# Patient Record
Sex: MALE | Race: WHITE | Employment: FULL TIME
[De-identification: names, ages, dates, MRNs, and addresses within clinical notes are randomized per-mention and may not be internally consistent; named-entity substitution may affect disease eponyms.]

---

## 2017-01-11 ENCOUNTER — OFFICE VISIT (OUTPATIENT)
Dept: FAMILY MEDICINE CLINIC | Facility: CLINIC | Age: 72
End: 2017-01-11

## 2017-01-11 VITALS
HEIGHT: 73 IN | DIASTOLIC BLOOD PRESSURE: 77 MMHG | WEIGHT: 224.6 LBS | BODY MASS INDEX: 29.77 KG/M2 | SYSTOLIC BLOOD PRESSURE: 128 MMHG | RESPIRATION RATE: 18 BRPM | HEART RATE: 63 BPM

## 2017-01-11 DIAGNOSIS — Z23 ENCOUNTER FOR VACCINATION: ICD-10-CM

## 2017-01-11 DIAGNOSIS — L91.8 SKIN TAG: ICD-10-CM

## 2017-01-11 DIAGNOSIS — Z00.00 ANNUAL PHYSICAL EXAM: Primary | ICD-10-CM

## 2017-01-11 PROCEDURE — 99397 PER PM REEVAL EST PAT 65+ YR: CPT | Performed by: FAMILY MEDICINE

## 2017-01-11 PROCEDURE — 90715 TDAP VACCINE 7 YRS/> IM: CPT | Performed by: FAMILY MEDICINE

## 2017-01-11 PROCEDURE — 90471 IMMUNIZATION ADMIN: CPT | Performed by: FAMILY MEDICINE

## 2017-01-11 RX ORDER — OYSTER SHELL CALCIUM WITH VITAMIN D 500; 200 MG/1; [IU]/1
1 TABLET, FILM COATED ORAL DAILY
Qty: 30 TABLET | Refills: 3 | Status: SHIPPED | OUTPATIENT
Start: 2017-01-11 | End: 2020-12-14

## 2017-01-11 ASSESSMENT — PATIENT HEALTH QUESTIONNAIRE - PHQ9
SUM OF ALL RESPONSES TO PHQ9 QUESTIONS 1 & 2: 0
SUM OF ALL RESPONSES TO PHQ QUESTIONS 1-9: 0
2. FEELING DOWN, DEPRESSED OR HOPELESS: 0
1. LITTLE INTEREST OR PLEASURE IN DOING THINGS: 0

## 2017-02-23 ENCOUNTER — HOSPITAL ENCOUNTER (OUTPATIENT)
Age: 72
Discharge: HOME | End: 2017-02-23
Payer: MEDICARE

## 2017-02-23 PROCEDURE — 93005 ELECTROCARDIOGRAM TRACING: CPT

## 2017-02-24 LAB
EKG ATRIAL RATE: 61 BPM
EKG P AXIS: 68 DEGREES
EKG P-R INTERVAL: 202 MS
EKG Q-T INTERVAL: 458 MS
EKG QRS DURATION: 156 MS
EKG QTC CALCULATION (BAZETT): 461 MS
EKG R AXIS: -31 DEGREES
EKG T AXIS: -7 DEGREES
EKG VENTRICULAR RATE: 61 BPM

## 2018-09-27 PROBLEM — Z00.00 ANNUAL PHYSICAL EXAM: Status: RESOLVED | Noted: 2017-01-11 | Resolved: 2018-09-27

## 2019-02-27 ENCOUNTER — OFFICE VISIT (OUTPATIENT)
Dept: FAMILY MEDICINE CLINIC | Age: 74
End: 2019-02-27
Payer: MEDICARE

## 2019-02-27 VITALS
SYSTOLIC BLOOD PRESSURE: 132 MMHG | HEART RATE: 73 BPM | WEIGHT: 232.2 LBS | OXYGEN SATURATION: 98 % | HEIGHT: 73 IN | DIASTOLIC BLOOD PRESSURE: 78 MMHG | BODY MASS INDEX: 30.77 KG/M2

## 2019-02-27 DIAGNOSIS — Z23 NEED FOR PROPHYLACTIC VACCINATION AGAINST STREPTOCOCCUS PNEUMONIAE (PNEUMOCOCCUS): ICD-10-CM

## 2019-02-27 DIAGNOSIS — I10 ESSENTIAL HYPERTENSION: ICD-10-CM

## 2019-02-27 DIAGNOSIS — Z00.00 MEDICARE ANNUAL WELLNESS VISIT, INITIAL: Primary | ICD-10-CM

## 2019-02-27 DIAGNOSIS — Z00.00 ROUTINE GENERAL MEDICAL EXAMINATION AT A HEALTH CARE FACILITY: ICD-10-CM

## 2019-02-27 DIAGNOSIS — Z23 NEED FOR PROPHYLACTIC VACCINATION AND INOCULATION AGAINST VARICELLA: ICD-10-CM

## 2019-02-27 PROCEDURE — G0438 PPPS, INITIAL VISIT: HCPCS | Performed by: FAMILY MEDICINE

## 2019-02-27 PROCEDURE — 90670 PCV13 VACCINE IM: CPT | Performed by: FAMILY MEDICINE

## 2019-02-27 PROCEDURE — G0009 ADMIN PNEUMOCOCCAL VACCINE: HCPCS | Performed by: FAMILY MEDICINE

## 2019-02-27 PROCEDURE — 4040F PNEUMOC VAC/ADMIN/RCVD: CPT | Performed by: FAMILY MEDICINE

## 2019-02-27 PROCEDURE — G8484 FLU IMMUNIZE NO ADMIN: HCPCS | Performed by: FAMILY MEDICINE

## 2019-02-27 ASSESSMENT — PATIENT HEALTH QUESTIONNAIRE - PHQ9
SUM OF ALL RESPONSES TO PHQ QUESTIONS 1-9: 0
SUM OF ALL RESPONSES TO PHQ QUESTIONS 1-9: 0

## 2019-02-27 ASSESSMENT — ANXIETY QUESTIONNAIRES: GAD7 TOTAL SCORE: 0

## 2019-02-27 ASSESSMENT — LIFESTYLE VARIABLES: HOW OFTEN DO YOU HAVE A DRINK CONTAINING ALCOHOL: 0

## 2019-03-02 ENCOUNTER — HOSPITAL ENCOUNTER (OUTPATIENT)
Age: 74
Discharge: HOME | End: 2019-03-02
Payer: MEDICARE

## 2019-03-02 DIAGNOSIS — Z00.00 MEDICARE ANNUAL WELLNESS VISIT, INITIAL: ICD-10-CM

## 2019-03-02 LAB
ABSOLUTE EOS #: 0.2 K/UL (ref 0–0.4)
ABSOLUTE IMMATURE GRANULOCYTE: ABNORMAL K/UL (ref 0–0.3)
ABSOLUTE LYMPH #: 1.8 K/UL (ref 1–4.8)
ABSOLUTE MONO #: 0.7 K/UL (ref 0.1–1.3)
ALBUMIN SERPL-MCNC: 4 G/DL (ref 3.5–5.2)
ALBUMIN/GLOBULIN RATIO: 1.5 (ref 1–2.5)
ALP BLD-CCNC: 70 U/L (ref 40–129)
ALT SERPL-CCNC: 17 U/L (ref 5–41)
ANION GAP SERPL CALCULATED.3IONS-SCNC: 9 MMOL/L (ref 9–17)
AST SERPL-CCNC: 16 U/L
BASOPHILS # BLD: 1 % (ref 0–2)
BASOPHILS ABSOLUTE: 0 K/UL (ref 0–0.2)
BILIRUB SERPL-MCNC: 0.86 MG/DL (ref 0.3–1.2)
BUN BLDV-MCNC: 14 MG/DL (ref 8–23)
BUN/CREAT BLD: ABNORMAL (ref 9–20)
CALCIUM SERPL-MCNC: 9.2 MG/DL (ref 8.6–10.4)
CHLORIDE BLD-SCNC: 107 MMOL/L (ref 98–107)
CHOLESTEROL/HDL RATIO: 3
CHOLESTEROL: 133 MG/DL
CO2: 25 MMOL/L (ref 20–31)
CREAT SERPL-MCNC: 0.65 MG/DL (ref 0.7–1.2)
DIFFERENTIAL TYPE: ABNORMAL
EOSINOPHILS RELATIVE PERCENT: 4 % (ref 0–4)
GFR AFRICAN AMERICAN: >60 ML/MIN
GFR NON-AFRICAN AMERICAN: >60 ML/MIN
GFR SERPL CREATININE-BSD FRML MDRD: ABNORMAL ML/MIN/{1.73_M2}
GFR SERPL CREATININE-BSD FRML MDRD: ABNORMAL ML/MIN/{1.73_M2}
GLUCOSE BLD-MCNC: 104 MG/DL (ref 70–99)
HCT VFR BLD CALC: 43.2 % (ref 41–53)
HDLC SERPL-MCNC: 45 MG/DL
HEMOGLOBIN: 15.1 G/DL (ref 13.5–17.5)
HEPATITIS C ANTIBODY: NONREACTIVE
IMMATURE GRANULOCYTES: ABNORMAL %
LDL CHOLESTEROL: 75 MG/DL (ref 0–130)
LYMPHOCYTES # BLD: 31 % (ref 24–44)
MCH RBC QN AUTO: 33.3 PG (ref 26–34)
MCHC RBC AUTO-ENTMCNC: 35 G/DL (ref 31–37)
MCV RBC AUTO: 95.1 FL (ref 80–100)
MONOCYTES # BLD: 12 % (ref 1–7)
NRBC AUTOMATED: ABNORMAL PER 100 WBC
PDW BLD-RTO: 13.3 % (ref 11.5–14.9)
PLATELET # BLD: 206 K/UL (ref 150–450)
PLATELET ESTIMATE: ABNORMAL
PMV BLD AUTO: 8.2 FL (ref 6–12)
POTASSIUM SERPL-SCNC: 4.6 MMOL/L (ref 3.7–5.3)
RBC # BLD: 4.54 M/UL (ref 4.5–5.9)
RBC # BLD: ABNORMAL 10*6/UL
SEG NEUTROPHILS: 52 % (ref 36–66)
SEGMENTED NEUTROPHILS ABSOLUTE COUNT: 3 K/UL (ref 1.3–9.1)
SODIUM BLD-SCNC: 141 MMOL/L (ref 135–144)
TOTAL PROTEIN: 6.7 G/DL (ref 6.4–8.3)
TRIGL SERPL-MCNC: 65 MG/DL
VLDLC SERPL CALC-MCNC: NORMAL MG/DL (ref 1–30)
WBC # BLD: 5.8 K/UL (ref 3.5–11)
WBC # BLD: ABNORMAL 10*3/UL

## 2019-03-02 PROCEDURE — 80053 COMPREHEN METABOLIC PANEL: CPT

## 2019-03-02 PROCEDURE — 85025 COMPLETE CBC W/AUTO DIFF WBC: CPT

## 2019-03-02 PROCEDURE — 86803 HEPATITIS C AB TEST: CPT

## 2019-03-02 PROCEDURE — 36415 COLL VENOUS BLD VENIPUNCTURE: CPT

## 2019-03-02 PROCEDURE — 80061 LIPID PANEL: CPT

## 2019-04-10 ENCOUNTER — OFFICE VISIT (OUTPATIENT)
Dept: FAMILY MEDICINE CLINIC | Age: 74
End: 2019-04-10
Payer: MEDICARE

## 2019-04-10 VITALS
SYSTOLIC BLOOD PRESSURE: 139 MMHG | HEIGHT: 72 IN | DIASTOLIC BLOOD PRESSURE: 75 MMHG | WEIGHT: 237 LBS | OXYGEN SATURATION: 97 % | BODY MASS INDEX: 32.1 KG/M2 | TEMPERATURE: 97 F | HEART RATE: 71 BPM

## 2019-04-10 DIAGNOSIS — L91.8 CUTANEOUS SKIN TAGS: Primary | ICD-10-CM

## 2019-04-10 DIAGNOSIS — L72.3 SEBACEOUS CYST: ICD-10-CM

## 2019-04-10 PROCEDURE — 11200 RMVL SKIN TAGS UP TO&INC 15: CPT | Performed by: FAMILY MEDICINE

## 2019-04-10 NOTE — PROGRESS NOTES
Chronic Disease Visit Information    BP Readings from Last 3 Encounters:   04/10/19 139/75   02/27/19 132/78   01/11/17 128/77          LDL Cholesterol (mg/dL)   Date Value   03/02/2019 75     HDL (mg/dL)   Date Value   03/02/2019 45     BUN (mg/dL)   Date Value   03/02/2019 14     CREATININE (mg/dL)   Date Value   03/02/2019 0.65 (L)     Glucose (mg/dL)   Date Value   03/02/2019 104 (H)            Have you changed or started any medications since your last visit including any over-the-counter medicines, vitamins, or herbal medicines? no   Are you having any side effects from any of your medications? -  no  Have you stopped taking any of your medications? Is so, why? -  no    Have you seen any other physician or provider since your last visit? No  Have you had any other diagnostic tests since your last visit? No  Have you been seen in the emergency room and/or had an admission to a hospital since we last saw you? No  Have you had your annual diabetic retinal (eye) exam? No  Have you had your routine dental cleaning in the past 6 months? no    Have you activated your [email or web address] account? If not, what are your barriers?  No: declined     Patient Care Team:  Mirta Gauthier MD as PCP - General (Family Medicine)      Health Maintenance   Topic Date Due    Colon cancer screen colonoscopy  04/15/2021    Lipid screen  03/02/2024    DTaP/Tdap/Td vaccine (2 - Td) 01/11/2027    Flu vaccine  Completed    Shingles Vaccine  Completed    Pneumococcal 65+ years Vaccine  Completed    AAA screen  Completed    Hepatitis C screen  Completed

## 2019-04-11 RX ORDER — LIDOCAINE HYDROCHLORIDE 10 MG/ML
5 INJECTION, SOLUTION INFILTRATION; PERINEURAL ONCE
Qty: 5 ML | Refills: 0 | Status: SHIPPED | OUTPATIENT
Start: 2019-04-11 | End: 2019-04-11 | Stop reason: CLARIF

## 2019-04-11 NOTE — PROGRESS NOTES
Procedure Note      Dony Koenig  1945  [id number]    Pre-operative Diagnosis: skin tags     Post-operative Diagnosis: Same  Vitals:    04/10/19 1543   BP: 139/75   Pulse: 71   Temp: 97 °F (36.1 °C)   SpO2: 97%     Procedure: Patient has 2 large skin tags, one on popliteal fossa on the  right knee and one on back near the lumbar region. The skin tag on knee was  5x 5 cm, and the lesion was clear floor light growth on back about 5x5 centimeters. The was laying on bed on prone position, both areas were cleaned with alcohol swab. Both skin lesions were injected with 1% lidocaine. Both skin tags were removed with scissors . Hemostasis was achieved with silver nitrate cauterization sticks. Patient tolerated procedure well. Both areas were packed with topical antibiotic and steroid gauze.     Anesthesia: Local 1% lidocaine without epi    Attending/Resident/Assistants:  att. providers found, Raman Durand MA Asst. Gillian coburn  Estimated Blood Loss: Minimal    Complications: none    Specimens: were not obtained       Raman Durand  4/10/2019

## 2019-04-12 RX ORDER — LIDOCAINE HYDROCHLORIDE 10 MG/ML
5 INJECTION, SOLUTION INFILTRATION; PERINEURAL ONCE
Status: COMPLETED | OUTPATIENT
Start: 2019-04-12 | End: 2019-04-12

## 2019-04-12 RX ADMIN — LIDOCAINE HYDROCHLORIDE 5 ML: 10 INJECTION, SOLUTION INFILTRATION; PERINEURAL at 15:00

## 2020-03-25 ENCOUNTER — TELEPHONE (OUTPATIENT)
Dept: FAMILY MEDICINE CLINIC | Age: 75
End: 2020-03-25

## 2020-04-23 ENCOUNTER — TELEPHONE (OUTPATIENT)
Dept: FAMILY MEDICINE CLINIC | Age: 75
End: 2020-04-23

## 2020-04-23 ENCOUNTER — TELEPHONE (OUTPATIENT)
Dept: ADMINISTRATIVE | Age: 75
End: 2020-04-23

## 2020-05-14 ENCOUNTER — OFFICE VISIT (OUTPATIENT)
Dept: ORTHOPEDIC SURGERY | Age: 75
End: 2020-05-14
Payer: MEDICARE

## 2020-05-14 VITALS — WEIGHT: 236.99 LBS | BODY MASS INDEX: 32.1 KG/M2 | HEIGHT: 72 IN

## 2020-05-14 PROCEDURE — 99213 OFFICE O/P EST LOW 20 MIN: CPT | Performed by: ORTHOPAEDIC SURGERY

## 2020-05-14 PROCEDURE — 4040F PNEUMOC VAC/ADMIN/RCVD: CPT | Performed by: ORTHOPAEDIC SURGERY

## 2020-05-14 PROCEDURE — 1036F TOBACCO NON-USER: CPT | Performed by: ORTHOPAEDIC SURGERY

## 2020-05-14 PROCEDURE — G8427 DOCREV CUR MEDS BY ELIG CLIN: HCPCS | Performed by: ORTHOPAEDIC SURGERY

## 2020-05-14 PROCEDURE — 1123F ACP DISCUSS/DSCN MKR DOCD: CPT | Performed by: ORTHOPAEDIC SURGERY

## 2020-05-14 PROCEDURE — G8419 CALC BMI OUT NRM PARAM NOF/U: HCPCS | Performed by: ORTHOPAEDIC SURGERY

## 2020-05-14 PROCEDURE — 20610 DRAIN/INJ JOINT/BURSA W/O US: CPT | Performed by: ORTHOPAEDIC SURGERY

## 2020-05-14 PROCEDURE — 3017F COLORECTAL CA SCREEN DOC REV: CPT | Performed by: ORTHOPAEDIC SURGERY

## 2020-05-14 RX ORDER — LIDOCAINE HYDROCHLORIDE 10 MG/ML
2 INJECTION, SOLUTION EPIDURAL; INFILTRATION; INTRACAUDAL; PERINEURAL ONCE
Status: COMPLETED | OUTPATIENT
Start: 2020-05-14 | End: 2020-05-14

## 2020-05-14 RX ORDER — BUPIVACAINE HYDROCHLORIDE 5 MG/ML
2 INJECTION, SOLUTION PERINEURAL ONCE
Status: COMPLETED | OUTPATIENT
Start: 2020-05-14 | End: 2020-05-14

## 2020-05-14 RX ORDER — BETAMETHASONE SODIUM PHOSPHATE AND BETAMETHASONE ACETATE 3; 3 MG/ML; MG/ML
12 INJECTION, SUSPENSION INTRA-ARTICULAR; INTRALESIONAL; INTRAMUSCULAR; SOFT TISSUE ONCE
Status: COMPLETED | OUTPATIENT
Start: 2020-05-14 | End: 2020-05-14

## 2020-05-14 RX ADMIN — BETAMETHASONE SODIUM PHOSPHATE AND BETAMETHASONE ACETATE 12 MG: 3; 3 INJECTION, SUSPENSION INTRA-ARTICULAR; INTRALESIONAL; INTRAMUSCULAR; SOFT TISSUE at 11:13

## 2020-05-14 RX ADMIN — BUPIVACAINE HYDROCHLORIDE 10 MG: 5 INJECTION, SOLUTION PERINEURAL at 11:14

## 2020-05-14 RX ADMIN — LIDOCAINE HYDROCHLORIDE 2 ML: 10 INJECTION, SOLUTION EPIDURAL; INFILTRATION; INTRACAUDAL; PERINEURAL at 11:15

## 2020-05-14 NOTE — PROGRESS NOTES
Provider:  Uday Begum MD    NDC:  7918-7385-74    Lot#:  [id number]    :  QURCM    Patient Supplied?:  No              Uday Begum MD    Please note that this chart was generated using voicerecognition Dragon dictation software. Although every effort was made to ensurethe accuracy of this automated transcription, some errors in transcription may haveoccurred.

## 2020-06-30 ENCOUNTER — OFFICE VISIT (OUTPATIENT)
Dept: FAMILY MEDICINE CLINIC | Age: 75
End: 2020-06-30
Payer: MEDICARE

## 2020-06-30 VITALS
BODY MASS INDEX: 33.72 KG/M2 | OXYGEN SATURATION: 97 % | SYSTOLIC BLOOD PRESSURE: 130 MMHG | WEIGHT: 249 LBS | HEART RATE: 85 BPM | HEIGHT: 72 IN | DIASTOLIC BLOOD PRESSURE: 80 MMHG

## 2020-06-30 PROCEDURE — 4040F PNEUMOC VAC/ADMIN/RCVD: CPT | Performed by: FAMILY MEDICINE

## 2020-06-30 PROCEDURE — G0439 PPPS, SUBSEQ VISIT: HCPCS | Performed by: FAMILY MEDICINE

## 2020-06-30 PROCEDURE — 3017F COLORECTAL CA SCREEN DOC REV: CPT | Performed by: FAMILY MEDICINE

## 2020-06-30 PROCEDURE — 1123F ACP DISCUSS/DSCN MKR DOCD: CPT | Performed by: FAMILY MEDICINE

## 2020-06-30 ASSESSMENT — LIFESTYLE VARIABLES: HOW OFTEN DO YOU HAVE A DRINK CONTAINING ALCOHOL: 0

## 2020-06-30 ASSESSMENT — PATIENT HEALTH QUESTIONNAIRE - PHQ9
SUM OF ALL RESPONSES TO PHQ QUESTIONS 1-9: 0
SUM OF ALL RESPONSES TO PHQ QUESTIONS 1-9: 0

## 2020-06-30 NOTE — PROGRESS NOTES
Visit Information    Have you changed or started any medications since your last visit including any over-the-counter medicines, vitamins, or herbal medicines? no   Are you having any side effects from any of your medications? -  no  Have you stopped taking any of your medications? Is so, why? -  no    Have you seen any other physician or provider since your last visit? No  Have you had any other diagnostic tests since your last visit? No  Have you been seen in the emergency room and/or had an admission to a hospital since we last saw you? No  Have you had your routine dental cleaning in the past 6 months? no    Have you activated your [employer] account? If not, what are your barriers?  No     Patient Care Team:  Mariaelena Sanchez MD as PCP - General (Family Medicine)  Mariaelena Sanchez MD as PCP - [facility]    Medical History Review  Past Medical, Family, and Social History reviewed and does contribute to the patient presenting condition    Health Maintenance   Topic Date Due    Annual Wellness Visit (AWV)  05/29/2019    Pneumococcal 65+ years Vaccine (2 of 2 - PPSV23) 02/27/2020    Colon cancer screen colonoscopy  04/15/2021    Lipid screen  03/02/2024    DTaP/Tdap/Td vaccine (2 - Td) 01/11/2027    Flu vaccine  Completed    Shingles Vaccine  Completed    AAA screen  Completed    Hepatitis C screen  Completed    Hepatitis A vaccine  Aged Out    Hepatitis B vaccine  Aged Out    Hib vaccine  Aged Out    Meningococcal (ACWY) vaccine  Aged Out

## 2020-07-11 ENCOUNTER — HOSPITAL ENCOUNTER (OUTPATIENT)
Age: 75
Discharge: HOME | End: 2020-07-11
Payer: MEDICARE

## 2020-07-11 LAB
ALBUMIN SERPL-MCNC: 4.2 G/DL (ref 3.5–5.2)
ALBUMIN/GLOBULIN RATIO: ABNORMAL (ref 1–2.5)
ALP BLD-CCNC: 67 U/L (ref 40–129)
ALT SERPL-CCNC: 15 U/L (ref 5–41)
ANION GAP SERPL CALCULATED.3IONS-SCNC: 9 MMOL/L (ref 9–17)
AST SERPL-CCNC: 16 U/L
BILIRUB SERPL-MCNC: 0.71 MG/DL (ref 0.3–1.2)
BUN BLDV-MCNC: 18 MG/DL (ref 8–23)
BUN/CREAT BLD: ABNORMAL (ref 9–20)
CALCIUM SERPL-MCNC: 9.1 MG/DL (ref 8.6–10.4)
CHLORIDE BLD-SCNC: 105 MMOL/L (ref 98–107)
CO2: 26 MMOL/L (ref 20–31)
CREAT SERPL-MCNC: 0.76 MG/DL (ref 0.7–1.2)
GFR AFRICAN AMERICAN: >60 ML/MIN
GFR NON-AFRICAN AMERICAN: >60 ML/MIN
GFR SERPL CREATININE-BSD FRML MDRD: ABNORMAL ML/MIN/{1.73_M2}
GFR SERPL CREATININE-BSD FRML MDRD: ABNORMAL ML/MIN/{1.73_M2}
GLUCOSE BLD-MCNC: 109 MG/DL (ref 70–99)
HCT VFR BLD CALC: 42.2 % (ref 41–53)
HEMOGLOBIN: 14.4 G/DL (ref 13.5–17.5)
MCH RBC QN AUTO: 32.2 PG (ref 26–34)
MCHC RBC AUTO-ENTMCNC: 34.2 G/DL (ref 31–37)
MCV RBC AUTO: 94 FL (ref 80–100)
NRBC AUTOMATED: ABNORMAL PER 100 WBC
PDW BLD-RTO: 13 % (ref 11.5–14.9)
PLATELET # BLD: 214 K/UL (ref 150–450)
PMV BLD AUTO: 7.6 FL (ref 6–12)
POTASSIUM SERPL-SCNC: 4.5 MMOL/L (ref 3.7–5.3)
RBC # BLD: 4.49 M/UL (ref 4.5–5.9)
SODIUM BLD-SCNC: 140 MMOL/L (ref 135–144)
TOTAL PROTEIN: 6.3 G/DL (ref 6.4–8.3)
VITAMIN D 25-HYDROXY: 22.6 NG/ML (ref 30–100)
WBC # BLD: 5.4 K/UL (ref 3.5–11)

## 2020-07-11 PROCEDURE — 85027 COMPLETE CBC AUTOMATED: CPT

## 2020-07-11 PROCEDURE — 82306 VITAMIN D 25 HYDROXY: CPT

## 2020-07-11 PROCEDURE — 36415 COLL VENOUS BLD VENIPUNCTURE: CPT

## 2020-07-11 PROCEDURE — 80053 COMPREHEN METABOLIC PANEL: CPT

## 2020-07-13 ENCOUNTER — TELEPHONE (OUTPATIENT)
Dept: FAMILY MEDICINE CLINIC | Age: 75
End: 2020-07-13

## 2020-07-13 NOTE — TELEPHONE ENCOUNTER
Patient said he was not able to get done Lipids and TSH because diagnosis are not covered by medicare , patient is asking if you can change DX on these two orders   Please Advice

## 2020-07-15 RX ORDER — ERGOCALCIFEROL 1.25 MG/1
50000 CAPSULE ORAL WEEKLY
Qty: 12 CAPSULE | Refills: 1 | Status: SHIPPED | OUTPATIENT
Start: 2020-07-15 | End: 2020-10-01 | Stop reason: SDUPTHER

## 2020-07-18 ENCOUNTER — HOSPITAL ENCOUNTER (OUTPATIENT)
Age: 75
Discharge: HOME | End: 2020-07-18
Payer: MEDICARE

## 2020-07-18 LAB
CHOLESTEROL/HDL RATIO: 3.4
CHOLESTEROL: 141 MG/DL
HDLC SERPL-MCNC: 41 MG/DL
LDL CHOLESTEROL: 83 MG/DL (ref 0–130)
TRIGL SERPL-MCNC: 84 MG/DL
TSH SERPL DL<=0.05 MIU/L-ACNC: 2.94 MIU/L (ref 0.3–5)
VLDLC SERPL CALC-MCNC: NORMAL MG/DL (ref 1–30)

## 2020-07-18 PROCEDURE — 36415 COLL VENOUS BLD VENIPUNCTURE: CPT

## 2020-07-18 PROCEDURE — 80061 LIPID PANEL: CPT

## 2020-07-18 PROCEDURE — 84443 ASSAY THYROID STIM HORMONE: CPT

## 2020-08-18 ENCOUNTER — OFFICE VISIT (OUTPATIENT)
Dept: PODIATRY | Age: 75
End: 2020-08-18
Payer: MEDICARE

## 2020-08-18 VITALS — BODY MASS INDEX: 33.18 KG/M2 | WEIGHT: 245 LBS | HEIGHT: 72 IN

## 2020-08-18 PROCEDURE — 3017F COLORECTAL CA SCREEN DOC REV: CPT | Performed by: PODIATRIST

## 2020-08-18 PROCEDURE — 1036F TOBACCO NON-USER: CPT | Performed by: PODIATRIST

## 2020-08-18 PROCEDURE — 4040F PNEUMOC VAC/ADMIN/RCVD: CPT | Performed by: PODIATRIST

## 2020-08-18 PROCEDURE — G8417 CALC BMI ABV UP PARAM F/U: HCPCS | Performed by: PODIATRIST

## 2020-08-18 PROCEDURE — G8427 DOCREV CUR MEDS BY ELIG CLIN: HCPCS | Performed by: PODIATRIST

## 2020-08-18 PROCEDURE — 1123F ACP DISCUSS/DSCN MKR DOCD: CPT | Performed by: PODIATRIST

## 2020-08-18 PROCEDURE — 99203 OFFICE O/P NEW LOW 30 MIN: CPT | Performed by: PODIATRIST

## 2020-08-18 ASSESSMENT — ENCOUNTER SYMPTOMS
NAUSEA: 0
DIARRHEA: 0
COLOR CHANGE: 0
VOMITING: 0
CONSTIPATION: 0

## 2020-08-18 NOTE — PATIENT INSTRUCTIONS
Patient Education        Plantar Fasciitis: Exercises  Introduction  Here are some examples of exercises for you to try. The exercises may be suggested for a condition or for rehabilitation. Start each exercise slowly. Ease off the exercises if you start to have pain. You will be told when to start these exercises and which ones will work best for you. How to do the exercises  Towel stretch   1. Sit with your legs extended and knees straight. 2. Place a towel around your foot just under the toes. 3. Hold each end of the towel in each hand, with your hands above your knees. 4. Pull back with the towel so that your foot stretches toward you. 5. Hold the position for at least 15 to 30 seconds. 6. Repeat 2 to 4 times a session, up to 5 sessions a day. Calf stretch   This exercise stretches the muscles at the back of the lower leg (the calf) and the Achilles tendon. Do this exercise 3 or 4 times a day, 5 days a week. 1. Stand facing a wall with your hands on the wall at about eye level. Put the leg you want to stretch about a step behind your other leg. 2. Keeping your back heel on the floor, bend your front knee until you feel a stretch in the back leg. 3. Hold the stretch for 15 to 30 seconds. Repeat 2 to 4 times. Plantar fascia and calf stretch   Stretching the plantar fascia and calf muscles can increase flexibility and decrease heel pain. You can do this exercise several times each day and before and after activity. 1. Stand on a step as shown above. Be sure to hold on to the banister. 2. Slowly let your heels down over the edge of the step as you relax your calf muscles. You should feel a gentle stretch across the bottom of your foot and up the back of your leg to your knee. 3. Hold the stretch about 15 to 30 seconds, and then tighten your calf muscle a little to bring your heel back up to the level of the step. Repeat 2 to 4 times.     Towel curls   Make this exercise more challenging by placing a weighted object, such as a soup can, on the other end of the towel. 1. While sitting, place your foot on a towel on the floor and scrunch the towel toward you with your toes. 2. Then, also using your toes, push the towel away from you. [place] pickups   1. Put marbles on the floor next to a cup.  2. Using your toes, try to lift the marbles up from the floor and put them in the cup. Follow-up care is a key part of your treatment and safety. Be sure to make and go to all appointments, and call your doctor if you are having problems. It's also a good idea to know your test results and keep a list of the medicines you take. Where can you learn more? Go to [email or web address] and sign in to your [employer] account. Enter X105 in the Neurotrack box to learn more about \"Plantar Fasciitis: Exercises. \"     If you do not have an account, please click on the \"Sign Up Now\" link. Current as of: March 2, 2020               Content Version: 12.5  © 3052-8180 Healthwise, Incorporated. Care instructions adapted under license by [facility] ([facility]). If you have questions about a medical condition or this instruction, always ask your healthcare professional. [address] any warranty or liability for your use of this information.

## 2020-08-18 NOTE — PROGRESS NOTES
[facility]  New Patient History and Physical    Chief Complaint:   Chief Complaint   Patient presents with    Ankle Pain     burning, tightness, sore left heel and ankle for about a couple months    Other     no xrays        HPI: Kimmie Diane is a 76 y.o. male who presents to the office today complaining of left heel pain, burning. .  Symptoms began 2 month(s) ago. Patient relates pain is Present. Pain is rated 3 out of 10 and is described as waxing and waning. Treatments prior to today's visit include: none, no injury, burning at rest. Some swelling. Currently denies F/C/N/V. Allergies   Allergen Reactions    Lisinopril Other (See Comments)     cough       Past Medical History:   Diagnosis Date    Ankle fracture, right 1993    Surgery w/ hardware placed.  Hypertension     Ringing in ears        Prior to Admission medications    Medication Sig Start Date End Date Taking? Authorizing Provider   vitamin D (ERGOCALCIFEROL) 1.25 MG (80221 UT) CAPS capsule Take 1 capsule by mouth once a week 7/15/20  Yes Jocelyne Malik MD   calcium-vitamin D (OSCAL 500/200 D-3) 500-200 MG-UNIT per tablet Take 1 tablet by mouth daily 1/11/17  Yes Jocelyne Malik MD   vitamin B-12 (CYANOCOBALAMIN) 1000 MCG tablet Take 1,000 mcg by mouth daily. Yes Historical Provider, MD   Ascorbic Acid (VITAMIN C) 500 MG tablet Take 1,000 mg by mouth daily. Yes Historical Provider, MD   aspirin EC 81 MG EC tablet Take 81 mg by mouth daily. Yes Historical Provider, MD   Fiber TABS Take 1 capsule by mouth daily.  CLARIFY WITH PATIENT  4/6/11  Yes Historical Provider, MD       Past Surgical History:   Procedure Laterality Date    CYST REMOVAL  2001    FINGER    FRACTURE SURGERY  1993    Right ankle w/hardware    PILONIDAL CYST EXCISION  1966    TONSILLECTOMY  1950    VASECTOMY  1980       Family History   Problem Relation Age of Onset    Heart Disease Mother     High Blood Pressure Mother     High Blood Pressure Father     Heart Disease Father        Social History     Tobacco Use    Smoking status: Former Smoker     Packs/day: 2.00     Years: 30.00     Pack years: 60.00     Types: Cigarettes     Last attempt to quit: 1999     Years since quittin.8    Smokeless tobacco: Never Used   Substance Use Topics    Alcohol use: No       Review of Systems   Constitutional: Negative for chills, diaphoresis, fatigue, fever and unexpected weight change. Cardiovascular: Negative for leg swelling. Gastrointestinal: Negative for constipation, diarrhea, nausea and vomiting. Musculoskeletal: Positive for gait problem. Negative for arthralgias and joint swelling. Skin: Negative for color change, pallor, rash and wound. Neurological: Negative for weakness and numbness. Lower Extremity Physical Examination:     Vitals: There were no vitals filed for this visit. General: AAO x 3 in NAD. Vascular: DP and PT pulses palpable 2/4, Bilateral.  CFT <3 seconds, Bilateral.  Hair growth absent to the level of the digits, Bilateral.  Edema absent, Bilateral.  Varicosities absent, Bilateral. Erythema absent, Bilateral    Neurological:  Sensation present to light touch to level of digits, Bilateral.    Musculoskeletal: Muscle strength 5/5, Left. Pain present upon palpation of insertion of the achilles, prominence present, more pain alteral insertion, Left. normal medial longitudinal arch, Bilateral.  Ankle ROM normal,Left. Integument: Warm, dry, supple, Bilateral.  Open lesion absent, Bilateral.     Radiographs: 2 views left Foot:  Posterior calcaneal exostosis. Dania's deformity. No acute pathology. Gait analysis:     Asessment: Patient is a 76 y.o. male with:   1. Pain of left heel    2. Heel spur, left    3. Calcific Achilles tendinitis of left lower extremity    4. Dania's deformity, left          Plan: Patient examined and evaluated. Current condition and treatment options discussed in detail. Mi Mcdonald

## 2020-09-15 ENCOUNTER — OFFICE VISIT (OUTPATIENT)
Dept: PODIATRY | Age: 75
End: 2020-09-15
Payer: MEDICARE

## 2020-09-15 VITALS — WEIGHT: 240 LBS | BODY MASS INDEX: 32.51 KG/M2 | HEIGHT: 72 IN

## 2020-09-15 PROCEDURE — G8427 DOCREV CUR MEDS BY ELIG CLIN: HCPCS | Performed by: PODIATRIST

## 2020-09-15 PROCEDURE — 99213 OFFICE O/P EST LOW 20 MIN: CPT | Performed by: PODIATRIST

## 2020-09-15 PROCEDURE — 4040F PNEUMOC VAC/ADMIN/RCVD: CPT | Performed by: PODIATRIST

## 2020-09-15 PROCEDURE — 3017F COLORECTAL CA SCREEN DOC REV: CPT | Performed by: PODIATRIST

## 2020-09-15 PROCEDURE — G8417 CALC BMI ABV UP PARAM F/U: HCPCS | Performed by: PODIATRIST

## 2020-09-15 PROCEDURE — 1123F ACP DISCUSS/DSCN MKR DOCD: CPT | Performed by: PODIATRIST

## 2020-09-15 PROCEDURE — 1036F TOBACCO NON-USER: CPT | Performed by: PODIATRIST

## 2020-09-15 RX ORDER — PREDNISONE 10 MG/1
1 TABLET ORAL DAILY
Qty: 21 EACH | Refills: 0 | Status: SHIPPED | OUTPATIENT
Start: 2020-09-15 | End: 2020-10-13 | Stop reason: ALTCHOICE

## 2020-09-15 ASSESSMENT — ENCOUNTER SYMPTOMS
NAUSEA: 0
VOMITING: 0
COLOR CHANGE: 0
DIARRHEA: 0
CONSTIPATION: 0

## 2020-09-15 NOTE — PROGRESS NOTES
[facility]  Return Patient History and Physical    Chief Complaint:   Chief Complaint   Patient presents with    Follow-up     left heel, doing okay but still having pain        HPI: Martha Verdin is a 76 y.o. male who presents to the office today for follow up of left heel pain. The Duexis did not help much. Stretching helps, but he still has pain. History of complaining of left heel pain, burning. .  Symptoms began 2 month(s) ago. Patient relates pain is Present. Pain is rated 3 out of 10 and is described as waxing and waning. Treatments prior to today's visit include: none, no injury, burning at rest. Some swelling. Currently denies F/C/N/V. Allergies   Allergen Reactions    Lisinopril Other (See Comments)     cough       Past Medical History:   Diagnosis Date    Ankle fracture, right 1993    Surgery w/ hardware placed.  Hypertension     Ringing in ears        Prior to Admission medications    Medication Sig Start Date End Date Taking? Authorizing Provider   predniSONE 10 MG (21) TBPK Take 1 tablet by mouth daily Take 60mg po on day 1, 50mg po on day 2, 40mg po on day 3, 30mg po on day 4, 20 mg po on day 5, 10mg po on day 6 9/15/20  Yes Rachell Arora DPM   vitamin D (ERGOCALCIFEROL) 1.25 MG (38728 UT) CAPS capsule Take 1 capsule by mouth once a week 7/15/20  Yes Malick Jacob MD   calcium-vitamin D (OSCAL 500/200 D-3) 500-200 MG-UNIT per tablet Take 1 tablet by mouth daily 1/11/17  Yes Malick Jacob MD   vitamin B-12 (CYANOCOBALAMIN) 1000 MCG tablet Take 1,000 mcg by mouth daily. Yes Historical Provider, MD   Ascorbic Acid (VITAMIN C) 500 MG tablet Take 1,000 mg by mouth daily. Yes Historical Provider, MD   aspirin EC 81 MG EC tablet Take 81 mg by mouth daily. Yes Historical Provider, MD   Fiber TABS Take 1 capsule by mouth daily.  CLARIFY WITH PATIENT  4/6/11  Yes Historical Provider, MD       Past Surgical History:   Procedure Laterality Date    CYST REMOVAL  2001 FINGER    FRACTURE SURGERY  1993    Right ankle w/hardware    PILONIDAL CYST EXCISION  1966    TONSILLECTOMY  1950    VASECTOMY  1980       Family History   Problem Relation Age of Onset    Heart Disease Mother     High Blood Pressure Mother     High Blood Pressure Father     Heart Disease Father        Social History     Tobacco Use    Smoking status: Former Smoker     Packs/day: 2.00     Years: 30.00     Pack years: 60.00     Types: Cigarettes     Last attempt to quit: 1999     Years since quittin.8    Smokeless tobacco: Never Used   Substance Use Topics    Alcohol use: No       Review of Systems   Constitutional: Negative for chills, diaphoresis, fatigue, fever and unexpected weight change. Cardiovascular: Negative for leg swelling. Gastrointestinal: Negative for constipation, diarrhea, nausea and vomiting. Musculoskeletal: Positive for gait problem. Negative for arthralgias and joint swelling. Skin: Negative for color change, pallor, rash and wound. Neurological: Negative for weakness and numbness. Lower Extremity Physical Examination:     Vitals: There were no vitals filed for this visit. General: AAO x 3 in NAD. Vascular: DP and PT pulses palpable 2/4, Bilateral.  CFT <3 seconds, Bilateral.  Hair growth absent to the level of the digits, Bilateral.  Edema absent, Bilateral.  Varicosities absent, Bilateral. Erythema absent, Bilateral    Neurological:  Sensation present to light touch to level of digits, Bilateral.    Musculoskeletal: Muscle strength 5/5, Left. Pain present upon palpation of insertion of the achilles, prominence present, more pain alteral insertion, Left. normal medial longitudinal arch, Bilateral.  Ankle ROM normal,Left. Integument: Warm, dry, supple, Bilateral.  Open lesion absent, Bilateral.     Radiographs: 2 views left Foot:  Posterior calcaneal exostosis. Dania's deformity. No acute pathology.      Gait analysis:     Asessment: Patient is a

## 2020-10-01 RX ORDER — ERGOCALCIFEROL 1.25 MG/1
50000 CAPSULE ORAL WEEKLY
Qty: 12 CAPSULE | Refills: 1 | Status: SHIPPED | OUTPATIENT
Start: 2020-10-01 | End: 2021-06-23 | Stop reason: SDUPTHER

## 2020-10-13 ENCOUNTER — OFFICE VISIT (OUTPATIENT)
Dept: PODIATRY | Age: 75
End: 2020-10-13
Payer: MEDICARE

## 2020-10-13 VITALS — WEIGHT: 240 LBS | HEIGHT: 72 IN | BODY MASS INDEX: 32.51 KG/M2

## 2020-10-13 PROCEDURE — 1123F ACP DISCUSS/DSCN MKR DOCD: CPT | Performed by: PODIATRIST

## 2020-10-13 PROCEDURE — G8417 CALC BMI ABV UP PARAM F/U: HCPCS | Performed by: PODIATRIST

## 2020-10-13 PROCEDURE — 99213 OFFICE O/P EST LOW 20 MIN: CPT | Performed by: PODIATRIST

## 2020-10-13 PROCEDURE — G8427 DOCREV CUR MEDS BY ELIG CLIN: HCPCS | Performed by: PODIATRIST

## 2020-10-13 PROCEDURE — 3017F COLORECTAL CA SCREEN DOC REV: CPT | Performed by: PODIATRIST

## 2020-10-13 PROCEDURE — 1036F TOBACCO NON-USER: CPT | Performed by: PODIATRIST

## 2020-10-13 PROCEDURE — G8482 FLU IMMUNIZE ORDER/ADMIN: HCPCS | Performed by: PODIATRIST

## 2020-10-13 PROCEDURE — 4040F PNEUMOC VAC/ADMIN/RCVD: CPT | Performed by: PODIATRIST

## 2020-10-13 RX ORDER — PREDNISONE 10 MG/1
1 TABLET ORAL DAILY
Qty: 41 EACH | Refills: 0 | Status: SHIPPED | OUTPATIENT
Start: 2020-10-13 | End: 2020-11-16 | Stop reason: SDUPTHER

## 2020-10-13 ASSESSMENT — ENCOUNTER SYMPTOMS
DIARRHEA: 0
COLOR CHANGE: 0
CONSTIPATION: 0
NAUSEA: 0
VOMITING: 0

## 2020-10-13 NOTE — PROGRESS NOTES
[facility]  Return Patient History and Physical    Chief Complaint:   Chief Complaint   Patient presents with    Foot Pain     4 weeks left  heel pain f/u, PCP Dr. Sam Merida       HPI: Kennedy Kawasaki is a 76 y.o. male who presents to the office today for follow up of left heel pain. The Duexis did not help much. Stretching helps, but he still has pain. The prednisone helped. The night splint helps, he only wears it 30-40 minutes a day. History of complaining of left heel pain, burning. .  Symptoms began 2 month(s) ago. Patient relates pain is Present. Pain is rated 3 out of 10 and is described as waxing and waning. Treatments prior to today's visit include: none, no injury, burning at rest. Some swelling. Currently denies F/C/N/V. Allergies   Allergen Reactions    Lisinopril Other (See Comments)     cough       Past Medical History:   Diagnosis Date    Ankle fracture, right 1993    Surgery w/ hardware placed.  Hypertension     Ringing in ears        Prior to Admission medications    Medication Sig Start Date End Date Taking? Authorizing Provider   predniSONE 10 MG (21) TBPK Take 1 tablet by mouth daily 60mg po on day 1&2, 50mg po on day 3&4, 40mg po on day 5&6, 30mg po on day 7&8,20 mg po on day 9&10, 10mg po on day 11&12 10/13/20  Yes Katheryn Mcdonald DPM   vitamin D (ERGOCALCIFEROL) 1.25 MG (90140 UT) CAPS capsule Take 1 capsule by mouth once a week 10/1/20  Yes Eliana Richards MD   calcium-vitamin D (OSCAL 500/200 D-3) 500-200 MG-UNIT per tablet Take 1 tablet by mouth daily 1/11/17  Yes Eliana Richards MD   vitamin B-12 (CYANOCOBALAMIN) 1000 MCG tablet Take 1,000 mcg by mouth daily. Yes Historical Provider, MD   Ascorbic Acid (VITAMIN C) 500 MG tablet Take 1,000 mg by mouth daily. Yes Historical Provider, MD   aspirin EC 81 MG EC tablet Take 81 mg by mouth daily. Yes Historical Provider, MD   Fiber TABS Take 1 capsule by mouth daily.  CLARIFY WITH PATIENT  4/6/11  Yes Historical exostosis. Dania's deformity. No acute pathology. Gait analysis:     Asessment: Patient is a 76 y.o. male with:   1. Calcific Achilles tendinitis of left lower extremity    2. Dania's deformity, left    3. Heel spur, left    4. Pain of left heel          Plan: Patient examined and evaluated. Current condition and treatment options discussed in detail. .  Verbal and written instructions given to patient. Contact office with any questions/problems/concerns. Ice daily  Stretching, HEP info given    Prednisone taper, double dose, 12 days  Discussed PT  Night splint    NSAIDS as needed  Discussed topicals    May need MRI    No orders of the defined types were placed in this encounter. Orders Placed This Encounter   Medications    predniSONE 10 MG (21) TBPK     Sig: Take 1 tablet by mouth daily 60mg po on day 1&2, 50mg po on day 3&4, 40mg po on day 5&6, 30mg po on day 7&8,20 mg po on day 9&10, 10mg po on day 11&12     Dispense:  41 each     Refill:  0        RTC in 4week(s).     10/13/2020

## 2020-10-14 RX ORDER — PREDNISONE 10 MG/1
TABLET ORAL
Qty: 21 TABLET | Refills: 0 | Status: SHIPPED | OUTPATIENT
Start: 2020-10-14 | End: 2020-11-16 | Stop reason: ALTCHOICE

## 2020-10-14 NOTE — TELEPHONE ENCOUNTER
Please Approve or Refuse.        Next Visit Date:  11/16/2020   Last Visit Date: 10/13/2020    No results found for: LABA1C          ( goal A1C is < 7)   BP Readings from Last 3 Encounters:   06/30/20 130/80   04/10/19 139/75   02/27/19 132/78          (goal 120/80)  BUN   Date Value Ref Range Status   07/11/2020 18 8 - 23 mg/dL Final     CREATININE   Date Value Ref Range Status   07/11/2020 0.76 0.70 - 1.20 mg/dL Final     Potassium   Date Value Ref Range Status   07/11/2020 4.5 3.7 - 5.3 mmol/L Final

## 2020-11-16 ENCOUNTER — OFFICE VISIT (OUTPATIENT)
Dept: PODIATRY | Age: 75
End: 2020-11-16
Payer: MEDICARE

## 2020-11-16 VITALS — WEIGHT: 240 LBS | BODY MASS INDEX: 32.51 KG/M2 | HEIGHT: 72 IN

## 2020-11-16 PROCEDURE — 99213 OFFICE O/P EST LOW 20 MIN: CPT | Performed by: PODIATRIST

## 2020-11-16 PROCEDURE — 3017F COLORECTAL CA SCREEN DOC REV: CPT | Performed by: PODIATRIST

## 2020-11-16 PROCEDURE — 1123F ACP DISCUSS/DSCN MKR DOCD: CPT | Performed by: PODIATRIST

## 2020-11-16 PROCEDURE — G8427 DOCREV CUR MEDS BY ELIG CLIN: HCPCS | Performed by: PODIATRIST

## 2020-11-16 PROCEDURE — G8417 CALC BMI ABV UP PARAM F/U: HCPCS | Performed by: PODIATRIST

## 2020-11-16 PROCEDURE — G8482 FLU IMMUNIZE ORDER/ADMIN: HCPCS | Performed by: PODIATRIST

## 2020-11-16 PROCEDURE — 1036F TOBACCO NON-USER: CPT | Performed by: PODIATRIST

## 2020-11-16 PROCEDURE — 4040F PNEUMOC VAC/ADMIN/RCVD: CPT | Performed by: PODIATRIST

## 2020-11-16 RX ORDER — PREDNISONE 10 MG/1
1 TABLET ORAL DAILY
Qty: 41 EACH | Refills: 0 | Status: SHIPPED | OUTPATIENT
Start: 2020-11-16 | End: 2020-12-14 | Stop reason: ALTCHOICE

## 2020-11-16 ASSESSMENT — ENCOUNTER SYMPTOMS
DIARRHEA: 0
NAUSEA: 0
VOMITING: 0
CONSTIPATION: 0
COLOR CHANGE: 0

## 2020-11-16 NOTE — PROGRESS NOTES
[facility]  Return Patient History and Physical    Chief Complaint:   Chief Complaint   Patient presents with    Follow-up     left foot, steroids helped a lot        HPI: Deya Suresh is a 76 y.o. male who presents to the office today for follow up of left heel pain. The double dose of prednisone helped greatly, his pain went from a 7 to a 4. . The Duexis did not help much. Stretching helps, but he still has pain. The night splint helps, he only wears it 30-40 minutes a day. History of complaining of left heel pain, burning. . Currently denies F/C/N/V. Allergies   Allergen Reactions    Lisinopril Other (See Comments)     cough       Past Medical History:   Diagnosis Date    Ankle fracture, right 1993    Surgery w/ hardware placed.  Hypertension     Ringing in ears        Prior to Admission medications    Medication Sig Start Date End Date Taking? Authorizing Provider   predniSONE 10 MG (21) TBPK Take 1 tablet by mouth daily 60mg po on day 1&2, 50mg po on day 3&4, 40mg po on day 5&6, 30mg po on day 7&8,20 mg po on day 9&10, 10mg po on day 11&12 11/16/20  Yes Sarah Alvarado DPM   vitamin D (ERGOCALCIFEROL) 1.25 MG (64641 UT) CAPS capsule Take 1 capsule by mouth once a week 10/1/20  Yes Thierno Andrade MD   calcium-vitamin D (OSCAL 500/200 D-3) 500-200 MG-UNIT per tablet Take 1 tablet by mouth daily 1/11/17  Yes Thierno Andrade MD   vitamin B-12 (CYANOCOBALAMIN) 1000 MCG tablet Take 1,000 mcg by mouth daily. Yes Historical Provider, MD   Ascorbic Acid (VITAMIN C) 500 MG tablet Take 1,000 mg by mouth daily. Yes Historical Provider, MD   aspirin EC 81 MG EC tablet Take 81 mg by mouth daily. Yes Historical Provider, MD   Fiber TABS Take 1 capsule by mouth daily.  CLARIFY WITH PATIENT  4/6/11  Yes Historical Provider, MD       Past Surgical History:   Procedure Laterality Date    CYST REMOVAL  2001    FINGER    FRACTURE SURGERY  1993    Right ankle w/hardware    PILONIDAL CYST EXCISION  1966    TONSILLECTOMY  1950    VASECTOMY         Family History   Problem Relation Age of Onset    Heart Disease Mother     High Blood Pressure Mother     High Blood Pressure Father     Heart Disease Father        Social History     Tobacco Use    Smoking status: Former Smoker     Packs/day: 2.00     Years: 30.00     Pack years: 60.00     Types: Cigarettes     Last attempt to quit: 1999     Years since quittin.0    Smokeless tobacco: Never Used   Substance Use Topics    Alcohol use: No       Review of Systems   Constitutional: Negative for chills, diaphoresis, fatigue, fever and unexpected weight change. Cardiovascular: Negative for leg swelling. Gastrointestinal: Negative for constipation, diarrhea, nausea and vomiting. Musculoskeletal: Positive for gait problem. Negative for arthralgias and joint swelling. Skin: Negative for color change, pallor, rash and wound. Neurological: Negative for weakness and numbness. Lower Extremity Physical Examination:     Vitals: There were no vitals filed for this visit. General: AAO x 3 in NAD. Vascular: DP and PT pulses palpable 2/4, Bilateral.  CFT <3 seconds, Bilateral.  Hair growth absent to the level of the digits, Bilateral.  Edema absent, Bilateral.  Varicosities absent, Bilateral. Erythema absent, Bilateral    Neurological:  Sensation present to light touch to level of digits, Bilateral.    Musculoskeletal: Muscle strength 5/5, Left. Pain present upon palpation of insertion of the achilles, prominence present, more pain alteral insertion, Left. normal medial longitudinal arch, Bilateral.  Ankle ROM normal,Left. Integument: Warm, dry, supple, Bilateral.  Open lesion absent, Bilateral.     Radiographs: 2 views left Foot:  Posterior calcaneal exostosis. Dania's deformity. No acute pathology. Gait analysis:     Asessment: Patient is a 76 y.o. male with:   1.  Calcific Achilles tendinitis of left lower extremity 2. Dania's deformity, left    3. Heel spur, left    4. Pain of left heel          Plan: Patient examined and evaluated. Current condition and treatment options discussed in detail. .  Verbal and written instructions given to patient. Contact office with any questions/problems/concerns. Ice daily  Stretching, HEP info given    Another Prednisone taper, double dose, 12 days  Discussed PT  Night splint    NSAIDS as needed  Discussed topicals    May need MRI  CAM walker is a possibiltiy  No orders of the defined types were placed in this encounter. Orders Placed This Encounter   Medications    predniSONE 10 MG (21) TBPK     Sig: Take 1 tablet by mouth daily 60mg po on day 1&2, 50mg po on day 3&4, 40mg po on day 5&6, 30mg po on day 7&8,20 mg po on day 9&10, 10mg po on day 11&12     Dispense:  41 each     Refill:  0        RTC in 4week(s).     11/16/2020

## 2020-12-14 ENCOUNTER — OFFICE VISIT (OUTPATIENT)
Dept: PODIATRY | Age: 75
End: 2020-12-14
Payer: MEDICARE

## 2020-12-14 VITALS — HEIGHT: 72 IN | BODY MASS INDEX: 32.51 KG/M2 | WEIGHT: 240 LBS

## 2020-12-14 PROCEDURE — 4040F PNEUMOC VAC/ADMIN/RCVD: CPT | Performed by: PODIATRIST

## 2020-12-14 PROCEDURE — G8482 FLU IMMUNIZE ORDER/ADMIN: HCPCS | Performed by: PODIATRIST

## 2020-12-14 PROCEDURE — 1036F TOBACCO NON-USER: CPT | Performed by: PODIATRIST

## 2020-12-14 PROCEDURE — G8427 DOCREV CUR MEDS BY ELIG CLIN: HCPCS | Performed by: PODIATRIST

## 2020-12-14 PROCEDURE — G8417 CALC BMI ABV UP PARAM F/U: HCPCS | Performed by: PODIATRIST

## 2020-12-14 PROCEDURE — 99213 OFFICE O/P EST LOW 20 MIN: CPT | Performed by: PODIATRIST

## 2020-12-14 PROCEDURE — 1123F ACP DISCUSS/DSCN MKR DOCD: CPT | Performed by: PODIATRIST

## 2020-12-14 PROCEDURE — 3017F COLORECTAL CA SCREEN DOC REV: CPT | Performed by: PODIATRIST

## 2020-12-14 RX ORDER — PREDNISONE 10 MG/1
1 TABLET ORAL DAILY
Qty: 41 EACH | Refills: 0 | Status: SHIPPED | OUTPATIENT
Start: 2020-12-14 | End: 2021-01-18 | Stop reason: SDUPTHER

## 2020-12-14 ASSESSMENT — ENCOUNTER SYMPTOMS
VOMITING: 0
DIARRHEA: 0
CONSTIPATION: 0
COLOR CHANGE: 0
NAUSEA: 0

## 2020-12-14 NOTE — PROGRESS NOTES
[facility]  Return Patient History and Physical    Chief Complaint:   Chief Complaint   Patient presents with    Follow-up     left foot, pain comes and goes        HPI: Ashley Becker is a 76 y.o. male who presents to the office today for follow up of left heel pain. The 2nd double dose of prednisone helped, his pain went from a 4 to a 1-2 . The Duexis did not help much. Stretching helps, but he still has pain. The night splint helps, he only wears it 30-40 minutes a day. Asks about using heat. History of complaining of left heel pain, burning. . Currently denies F/C/N/V. Allergies   Allergen Reactions    Lisinopril Other (See Comments)     cough       Past Medical History:   Diagnosis Date    Ankle fracture, right 1993    Surgery w/ hardware placed.  Hypertension     Ringing in ears        Prior to Admission medications    Medication Sig Start Date End Date Taking? Authorizing Provider   predniSONE 10 MG (21) TBPK Take 1 tablet by mouth daily 60mg po on day 1&2, 50mg po on day 3&4, 40mg po on day 5&6, 30mg po on day 7&8,20 mg po on day 9&10, 10mg po on day 11&12 12/14/20  Yes Brian Acosta DPM   vitamin D (ERGOCALCIFEROL) 1.25 MG (01134 UT) CAPS capsule Take 1 capsule by mouth once a week 10/1/20  Yes Vishnu Daniels MD   vitamin B-12 (CYANOCOBALAMIN) 1000 MCG tablet Take 1,000 mcg by mouth daily. Yes Historical Provider, MD   Ascorbic Acid (VITAMIN C) 500 MG tablet Take 1,000 mg by mouth daily. Yes Historical Provider, MD   aspirin EC 81 MG EC tablet Take 81 mg by mouth daily. Yes Historical Provider, MD   Fiber TABS Take 1 capsule by mouth daily.  CLARIFY WITH PATIENT  4/6/11  Yes Historical Provider, MD       Past Surgical History:   Procedure Laterality Date    CYST REMOVAL  2001    FINGER    FRACTURE SURGERY  1993    Right ankle w/hardware    PILONIDAL CYST EXCISION  1966    TONSILLECTOMY  1950    VASECTOMY  1980       Family History   Problem Relation Age of Onset  Heart Disease Mother     High Blood Pressure Mother     High Blood Pressure Father     Heart Disease Father        Social History     Tobacco Use    Smoking status: Former Smoker     Packs/day: 2.00     Years: 30.00     Pack years: 60.00     Types: Cigarettes     Quit date: 1999     Years since quittin.1    Smokeless tobacco: Never Used   Substance Use Topics    Alcohol use: No       Review of Systems   Constitutional: Negative for chills, diaphoresis, fatigue, fever and unexpected weight change. Cardiovascular: Negative for leg swelling. Gastrointestinal: Negative for constipation, diarrhea, nausea and vomiting. Musculoskeletal: Positive for gait problem. Negative for arthralgias and joint swelling. Skin: Negative for color change, pallor, rash and wound. Neurological: Negative for weakness and numbness. Lower Extremity Physical Examination:     Vitals: There were no vitals filed for this visit. General: AAO x 3 in NAD. Vascular: DP and PT pulses palpable 2/4, Bilateral.  CFT <3 seconds, Bilateral.  Hair growth absent to the level of the digits, Bilateral.  Edema absent, Bilateral.  Varicosities absent, Bilateral. Erythema absent, Bilateral    Neurological:  Sensation present to light touch to level of digits, Bilateral.    Musculoskeletal: Muscle strength 5/5, Left. Pain present upon palpation of insertion of the achilles, prominence present, more pain alteral insertion, Left. normal medial longitudinal arch, Bilateral.  Ankle ROM normal,Left. Integument: Warm, dry, supple, Bilateral.  Open lesion absent, Bilateral.     Radiographs: 2 views left Foot:  Posterior calcaneal exostosis. Dania's deformity. No acute pathology. Gait analysis:     Asessment: Patient is a 76 y.o. male with:   1. Calcific Achilles tendinitis of left lower extremity    2. Dania's deformity, left    3. Heel spur, left    4.  Pain of left heel Plan: Patient examined and evaluated. Current condition and treatment options discussed in detail. .  Verbal and written instructions given to patient. Contact office with any questions/problems/concerns. Ice daily. Discussed warm/cold contrast treatment. Stretching, HEP info given    Another Prednisone taper, double dose, 12 days  Discussed PT  Night splint    NSAIDS as needed  Discussed topicals    May need MRI  CAM walker is a possibiltiy, discussed today,   No orders of the defined types were placed in this encounter. Orders Placed This Encounter   Medications    predniSONE 10 MG (21) TBPK     Sig: Take 1 tablet by mouth daily 60mg po on day 1&2, 50mg po on day 3&4, 40mg po on day 5&6, 30mg po on day 7&8,20 mg po on day 9&10, 10mg po on day 11&12     Dispense:  41 each     Refill:  0        RTC in 4week(s).     12/14/2020

## 2021-01-18 ENCOUNTER — OFFICE VISIT (OUTPATIENT)
Dept: PODIATRY | Age: 76
End: 2021-01-18
Payer: MEDICARE

## 2021-01-18 VITALS — HEIGHT: 72 IN | BODY MASS INDEX: 32.51 KG/M2 | WEIGHT: 240 LBS

## 2021-01-18 DIAGNOSIS — M92.62 HAGLUND'S DEFORMITY, LEFT: ICD-10-CM

## 2021-01-18 DIAGNOSIS — M79.672 PAIN OF LEFT HEEL: ICD-10-CM

## 2021-01-18 DIAGNOSIS — M77.32 HEEL SPUR, LEFT: ICD-10-CM

## 2021-01-18 DIAGNOSIS — M65.28 CALCIFIC ACHILLES TENDINITIS OF LEFT LOWER EXTREMITY: Primary | ICD-10-CM

## 2021-01-18 PROCEDURE — 1123F ACP DISCUSS/DSCN MKR DOCD: CPT | Performed by: PODIATRIST

## 2021-01-18 PROCEDURE — G8482 FLU IMMUNIZE ORDER/ADMIN: HCPCS | Performed by: PODIATRIST

## 2021-01-18 PROCEDURE — 1036F TOBACCO NON-USER: CPT | Performed by: PODIATRIST

## 2021-01-18 PROCEDURE — 4040F PNEUMOC VAC/ADMIN/RCVD: CPT | Performed by: PODIATRIST

## 2021-01-18 PROCEDURE — G8417 CALC BMI ABV UP PARAM F/U: HCPCS | Performed by: PODIATRIST

## 2021-01-18 PROCEDURE — 99213 OFFICE O/P EST LOW 20 MIN: CPT | Performed by: PODIATRIST

## 2021-01-18 PROCEDURE — G8427 DOCREV CUR MEDS BY ELIG CLIN: HCPCS | Performed by: PODIATRIST

## 2021-01-18 PROCEDURE — 3017F COLORECTAL CA SCREEN DOC REV: CPT | Performed by: PODIATRIST

## 2021-01-18 RX ORDER — PREDNISONE 10 MG/1
1 TABLET ORAL DAILY
Qty: 41 EACH | Refills: 0 | Status: SHIPPED | OUTPATIENT
Start: 2021-01-18 | End: 2021-02-22 | Stop reason: SDUPTHER

## 2021-01-18 ASSESSMENT — ENCOUNTER SYMPTOMS
NAUSEA: 0
DIARRHEA: 0
CONSTIPATION: 0
COLOR CHANGE: 0
VOMITING: 0

## 2021-01-18 NOTE — PROGRESS NOTES
[facility]  Return Patient History and Physical    Chief Complaint:   Chief Complaint   Patient presents with    Follow-up     left foot, doing the same       HPI: Josue Kennedy is a 76 y.o. male who presents to the office today for follow up of left heel pain. The 3nrd double dose of prednisone helped, his pain went from a 4 to a 1-2 . The Duexis did not help much. Stretching helps, but he still has pain. The night splint helps, he only wears it 30-40 minutes a day. Asks about using heat. History of complaining of left heel pain, burning. . Currently denies F/C/N/V. Allergies   Allergen Reactions    Lisinopril Other (See Comments)     cough       Past Medical History:   Diagnosis Date    Ankle fracture, right 1993    Surgery w/ hardware placed.  Hypertension     Ringing in ears        Prior to Admission medications    Medication Sig Start Date End Date Taking? Authorizing Provider   predniSONE 10 MG (21) TBPK Take 1 tablet by mouth daily 60mg po on day 1&2, 50mg po on day 3&4, 40mg po on day 5&6, 30mg po on day 7&8,20 mg po on day 9&10, 10mg po on day 11&12 1/18/21  Yes Hazel Rule, DPM   vitamin D (ERGOCALCIFEROL) 1.25 MG (52757 UT) CAPS capsule Take 1 capsule by mouth once a week 10/1/20  Yes Claire Evans MD   vitamin B-12 (CYANOCOBALAMIN) 1000 MCG tablet Take 1,000 mcg by mouth daily. Yes Historical Provider, MD   Ascorbic Acid (VITAMIN C) 500 MG tablet Take 1,000 mg by mouth daily. Yes Historical Provider, MD   aspirin EC 81 MG EC tablet Take 81 mg by mouth daily. Yes Historical Provider, MD   Fiber TABS Take 1 capsule by mouth daily.  CLARIFY WITH PATIENT  4/6/11  Yes Historical Provider, MD       Past Surgical History:   Procedure Laterality Date    CYST REMOVAL  2001    FINGER    FRACTURE SURGERY  1993    Right ankle w/hardware    PILONIDAL CYST EXCISION  1966    TONSILLECTOMY  1950    VASECTOMY  1980       Family History   Problem Relation Age of Onset    Heart condition and treatment options discussed in detail. .  Verbal and written instructions given to patient. Contact office with any questions/problems/concerns. Ice daily. Discussed warm/cold contrast treatment. Stretching, HEP info given    Another Prednisone taper, double dose, 12 days  Discussed PT  Night splint    NSAIDS as needed  Discussed topicals    May need MRI, offered today  CAM walker is a possibiltiy, discussed today,   No orders of the defined types were placed in this encounter. Orders Placed This Encounter   Medications    predniSONE 10 MG (21) TBPK     Sig: Take 1 tablet by mouth daily 60mg po on day 1&2, 50mg po on day 3&4, 40mg po on day 5&6, 30mg po on day 7&8,20 mg po on day 9&10, 10mg po on day 11&12     Dispense:  41 each     Refill:  0        RTC in 4week(s).     1/18/2021

## 2021-02-03 ENCOUNTER — IMMUNIZATION (OUTPATIENT)
Dept: LAB | Age: 76
End: 2021-02-03
Payer: MEDICARE

## 2021-02-03 PROCEDURE — 0011A COVID-19, MODERNA VACCINE 100MCG/0.5ML DOSE: CPT

## 2021-02-22 ENCOUNTER — OFFICE VISIT (OUTPATIENT)
Dept: PODIATRY | Age: 76
End: 2021-02-22
Payer: MEDICARE

## 2021-02-22 VITALS — HEIGHT: 72 IN | BODY MASS INDEX: 32.51 KG/M2 | WEIGHT: 240 LBS

## 2021-02-22 DIAGNOSIS — M79.672 PAIN OF LEFT HEEL: ICD-10-CM

## 2021-02-22 DIAGNOSIS — M77.32 HEEL SPUR, LEFT: ICD-10-CM

## 2021-02-22 DIAGNOSIS — M92.62 HAGLUND'S DEFORMITY, LEFT: ICD-10-CM

## 2021-02-22 DIAGNOSIS — M65.28 CALCIFIC ACHILLES TENDINITIS OF LEFT LOWER EXTREMITY: Primary | ICD-10-CM

## 2021-02-22 PROCEDURE — G8428 CUR MEDS NOT DOCUMENT: HCPCS | Performed by: PODIATRIST

## 2021-02-22 PROCEDURE — G8482 FLU IMMUNIZE ORDER/ADMIN: HCPCS | Performed by: PODIATRIST

## 2021-02-22 PROCEDURE — G8417 CALC BMI ABV UP PARAM F/U: HCPCS | Performed by: PODIATRIST

## 2021-02-22 PROCEDURE — 1123F ACP DISCUSS/DSCN MKR DOCD: CPT | Performed by: PODIATRIST

## 2021-02-22 PROCEDURE — 1036F TOBACCO NON-USER: CPT | Performed by: PODIATRIST

## 2021-02-22 PROCEDURE — 4040F PNEUMOC VAC/ADMIN/RCVD: CPT | Performed by: PODIATRIST

## 2021-02-22 PROCEDURE — 99213 OFFICE O/P EST LOW 20 MIN: CPT | Performed by: PODIATRIST

## 2021-02-22 PROCEDURE — 3017F COLORECTAL CA SCREEN DOC REV: CPT | Performed by: PODIATRIST

## 2021-02-22 RX ORDER — PREDNISONE 10 MG/1
1 TABLET ORAL DAILY
Qty: 41 EACH | Refills: 0 | Status: SHIPPED | OUTPATIENT
Start: 2021-02-22 | End: 2021-04-01 | Stop reason: ALTCHOICE

## 2021-02-22 ASSESSMENT — ENCOUNTER SYMPTOMS
DIARRHEA: 0
NAUSEA: 0
CONSTIPATION: 0
VOMITING: 0
COLOR CHANGE: 0

## 2021-02-22 NOTE — PROGRESS NOTES
[facility]  Return Patient History and Physical    Chief Complaint:   Chief Complaint   Patient presents with    Follow-up     Left foot - Pt stated doing the same & needs Prednisone refill       HPI: Kiah Madsen is a 76 y.o. male who presents to the office today for follow up of left heel pain. The 4th double dose of prednisone helped, his pain went to 0, but now at a 1-2 . The Duexis did not help much. Stretching helps, but he still has pain. The night splint helps, he only wears it 30-40 minutes a day. Asks about using heat. Using topicals    History of complaining of left heel pain, burning. . Currently denies F/C/N/V. Allergies   Allergen Reactions    Lisinopril Other (See Comments)     cough       Past Medical History:   Diagnosis Date    Ankle fracture, right 1993    Surgery w/ hardware placed.  Hypertension     Ringing in ears        Prior to Admission medications    Medication Sig Start Date End Date Taking? Authorizing Provider   predniSONE 10 MG (21) TBPK Take 1 tablet by mouth daily 60mg po on day 1&2, 50mg po on day 3&4, 40mg po on day 5&6, 30mg po on day 7&8,20 mg po on day 9&10, 10mg po on day 11&12 2/22/21  Yes Jose Rees DPM   vitamin D (ERGOCALCIFEROL) 1.25 MG (15478 UT) CAPS capsule Take 1 capsule by mouth once a week 10/1/20  Yes Antonietta Noble MD   vitamin B-12 (CYANOCOBALAMIN) 1000 MCG tablet Take 1,000 mcg by mouth daily. Yes Historical Provider, MD   Ascorbic Acid (VITAMIN C) 500 MG tablet Take 1,000 mg by mouth daily. Yes Historical Provider, MD   aspirin EC 81 MG EC tablet Take 81 mg by mouth daily. Yes Historical Provider, MD   Fiber TABS Take 1 capsule by mouth daily.  CLARIFY WITH PATIENT  4/6/11  Yes Historical Provider, MD       Past Surgical History:   Procedure Laterality Date    CYST REMOVAL  2001    FINGER    FRACTURE SURGERY  1993    Right ankle w/hardware    PILONIDAL CYST EXCISION  1966   [facility] Family History   Problem Relation Age of Onset    Heart Disease Mother     High Blood Pressure Mother     High Blood Pressure Father     Heart Disease Father        Social History     Tobacco Use    Smoking status: Former Smoker     Packs/day: 2.00     Years: 30.00     Pack years: 60.00     Types: Cigarettes     Quit date: 1999     Years since quittin.3    Smokeless tobacco: Never Used   Substance Use Topics    Alcohol use: No       Review of Systems   Constitutional: Negative for chills, diaphoresis, fatigue, fever and unexpected weight change. Cardiovascular: Negative for leg swelling. Gastrointestinal: Negative for constipation, diarrhea, nausea and vomiting. Musculoskeletal: Positive for gait problem. Negative for arthralgias and joint swelling. Skin: Negative for color change, pallor, rash and wound. Neurological: Negative for weakness and numbness. Lower Extremity Physical Examination:     Vitals: There were no vitals filed for this visit. General: AAO x 3 in NAD. Vascular: DP and PT pulses palpable 2/4, Bilateral.  CFT <3 seconds, Bilateral.  Hair growth absent to the level of the digits, Bilateral.  Edema absent, Bilateral.  Varicosities absent, Bilateral. Erythema absent, Bilateral    Neurological:  Sensation present to light touch to level of digits, Bilateral.    Musculoskeletal: Muscle strength 5/5, Left. Pain present upon palpation of insertion of the achilles, prominence present, more pain alteral insertion, Left. normal medial longitudinal arch, Bilateral.  Ankle ROM normal,Left. Integument: Warm, dry, supple, Bilateral.  Open lesion absent, Bilateral.     Radiographs: 2 views left Foot:  Posterior calcaneal exostosis. Dania's deformity. No acute pathology. Gait analysis:     Asessment: Patient is a 76 y.o. male with:   1. Calcific Achilles tendinitis of left lower extremity    2. Dania's deformity, left    3. Heel spur, left    4.  Pain of left heel          Plan: Patient examined and evaluated. Current condition and treatment options discussed in detail. .  Verbal and written instructions given to patient. Contact office with any questions/problems/concerns. Ice daily. Discussed warm/cold contrast treatment. Stretching, HEP info given    Another Prednisone taper, double dose, 12 days  Discussed PT  Night splint    NSAIDS as needed  Discussed topicals    May need MRI, offered today  CAM walker is a possibiltiy, discussed today,   No orders of the defined types were placed in this encounter.     Orders Placed This Encounter   Medications    predniSONE 10 MG (21) TBPK     Sig: Take 1 tablet by mouth daily 60mg po on day 1&2, 50mg po on day 3&4, 40mg po on day 5&6, 30mg po on day 7&8,20 mg po on day 9&10, 10mg po on day 11&12     Dispense:  41 each     Refill:  0        RTC in 4week(s).    2/22/2021

## 2021-03-03 ENCOUNTER — IMMUNIZATION (OUTPATIENT)
Dept: LAB | Age: 76
End: 2021-03-03
Payer: MEDICARE

## 2021-03-03 PROCEDURE — 91301 COVID-19, MODERNA VACCINE 100MCG/0.5ML DOSE: CPT

## 2021-04-01 ENCOUNTER — OFFICE VISIT (OUTPATIENT)
Dept: PODIATRY | Age: 76
End: 2021-04-01
Payer: MEDICARE

## 2021-04-01 VITALS — WEIGHT: 240 LBS | HEIGHT: 72 IN | BODY MASS INDEX: 32.51 KG/M2

## 2021-04-01 DIAGNOSIS — M79.672 PAIN OF LEFT HEEL: ICD-10-CM

## 2021-04-01 DIAGNOSIS — M65.28 CALCIFIC ACHILLES TENDINITIS OF LEFT LOWER EXTREMITY: Primary | ICD-10-CM

## 2021-04-01 DIAGNOSIS — M92.62 HAGLUND'S DEFORMITY, LEFT: ICD-10-CM

## 2021-04-01 DIAGNOSIS — M77.32 HEEL SPUR, LEFT: ICD-10-CM

## 2021-04-01 PROCEDURE — 4040F PNEUMOC VAC/ADMIN/RCVD: CPT | Performed by: PODIATRIST

## 2021-04-01 PROCEDURE — 3017F COLORECTAL CA SCREEN DOC REV: CPT | Performed by: PODIATRIST

## 2021-04-01 PROCEDURE — 1123F ACP DISCUSS/DSCN MKR DOCD: CPT | Performed by: PODIATRIST

## 2021-04-01 PROCEDURE — 1036F TOBACCO NON-USER: CPT | Performed by: PODIATRIST

## 2021-04-01 PROCEDURE — 99213 OFFICE O/P EST LOW 20 MIN: CPT | Performed by: PODIATRIST

## 2021-04-01 PROCEDURE — G8417 CALC BMI ABV UP PARAM F/U: HCPCS | Performed by: PODIATRIST

## 2021-04-01 PROCEDURE — G8427 DOCREV CUR MEDS BY ELIG CLIN: HCPCS | Performed by: PODIATRIST

## 2021-04-01 RX ORDER — PREDNISONE 10 MG/1
1 TABLET ORAL DAILY
Qty: 41 EACH | Refills: 0 | Status: SHIPPED | OUTPATIENT
Start: 2021-04-01 | End: 2021-05-10 | Stop reason: ALTCHOICE

## 2021-04-01 ASSESSMENT — ENCOUNTER SYMPTOMS
DIARRHEA: 0
VOMITING: 0
NAUSEA: 0
CONSTIPATION: 0
COLOR CHANGE: 0

## 2021-04-01 NOTE — PROGRESS NOTES
[facility]  Return Patient History and Physical    Chief Complaint:   Chief Complaint   Patient presents with    Check-Up     follow up left heel    Medication Refill     Prednisone       HPI: David Avery is a 76 y.o. male who presents to the office today for follow up of left heel pain. The 4th double dose of prednisone helped, his pain went to 0, but now at a 1 . The Duexis did not help much. Stretching helps, but he still has pain. The night splint helps, he only wears it 30-40 minutes a day. Asks about using heat. Using topicals    History of complaining of left heel pain, burning. . Currently denies F/C/N/V. Allergies   Allergen Reactions    Lisinopril Other (See Comments)     cough       Past Medical History:   Diagnosis Date    Ankle fracture, right 1993    Surgery w/ hardware placed.  Hypertension     Ringing in ears        Prior to Admission medications    Medication Sig Start Date End Date Taking? Authorizing Provider   predniSONE 10 MG (21) TBPK Take 1 tablet by mouth daily 60mg po on day 1&2, 50mg po on day 3&4, 40mg po on day 5&6, 30mg po on day 7&8,20 mg po on day 9&10, 10mg po on day 11&12 4/1/21  Yes Dony Room, [facility]   vitamin D (ERGOCALCIFEROL) 1.25 MG (73952 UT) CAPS capsule Take 1 capsule by mouth once a week 10/1/20  Yes Angelica Michael MD   vitamin B-12 (CYANOCOBALAMIN) 1000 MCG tablet Take 1,000 mcg by mouth daily. Yes Historical Provider, MD   Ascorbic Acid (VITAMIN C) 500 MG tablet Take 1,000 mg by mouth daily. Yes Historical Provider, MD   aspirin EC 81 MG EC tablet Take 81 mg by mouth daily. Yes Historical Provider, MD   Fiber TABS Take 1 capsule by mouth daily.  CLARIFY WITH PATIENT  4/6/11  Yes Historical Provider, MD       Past Surgical History:   Procedure Laterality Date    CYST REMOVAL  2001    FINGER    FRACTURE SURGERY  1993    Right ankle w/hardware    PILONIDAL CYST EXCISION  1966   [facility] History   Problem Relation Age of Onset    Heart Disease Mother     High Blood Pressure Mother     High Blood Pressure Father     Heart Disease Father        Social History     Tobacco Use    Smoking status: Former Smoker     Packs/day: 2.00     Years: 30.00     Pack years: 60.00     Types: Cigarettes     Quit date: 1999     Years since quittin.4    Smokeless tobacco: Never Used   Substance Use Topics    Alcohol use: No       Review of Systems   Constitutional: Negative for chills, diaphoresis, fatigue, fever and unexpected weight change. Cardiovascular: Negative for leg swelling. Gastrointestinal: Negative for constipation, diarrhea, nausea and vomiting. Musculoskeletal: Positive for gait problem. Negative for arthralgias and joint swelling. Skin: Negative for color change, pallor, rash and wound. Neurological: Negative for weakness and numbness. Lower Extremity Physical Examination:     Vitals: There were no vitals filed for this visit. General: AAO x 3 in NAD. Vascular: DP and PT pulses palpable 2/4, Bilateral.  CFT <3 seconds, Bilateral.  Hair growth absent to the level of the digits, Bilateral.  Edema absent, Bilateral.  Varicosities absent, Bilateral. Erythema absent, Bilateral    Neurological:  Sensation present to light touch to level of digits, Bilateral.    Musculoskeletal: Muscle strength 5/5, Left. Pain present upon palpation of insertion of the achilles, prominence present, more pain alteral insertion, Left. normal medial longitudinal arch, Bilateral.  Ankle ROM normal,Left. Integument: Warm, dry, supple, Bilateral.  Open lesion absent, Bilateral.     Radiographs: 2 views left Foot:  Posterior calcaneal exostosis. Dania's deformity. No acute pathology. Gait analysis:     Asessment: Patient is a 76 y.o. male with:   1. Calcific Achilles tendinitis of left lower extremity    2. Dania's deformity, left    3. Heel spur, left    4.  Pain of left heel Plan: Patient examined and evaluated. Current condition and treatment options discussed in detail. .  Verbal and written instructions given to patient. Contact office with any questions/problems/concerns. Ice daily. Discussed warm/cold contrast treatment. Stretching, HEP info given    Another Prednisone taper, double dose, 12 days  Discussed PT  Night splint    NSAIDS as needed  Discussed topicals    May need MRI, offered today  CAM walker is a possibiltiy, discussed today,   No orders of the defined types were placed in this encounter. Orders Placed This Encounter   Medications    predniSONE 10 MG (21) TBPK     Sig: Take 1 tablet by mouth daily 60mg po on day 1&2, 50mg po on day 3&4, 40mg po on day 5&6, 30mg po on day 7&8,20 mg po on day 9&10, 10mg po on day 11&12     Dispense:  41 each     Refill:  0        RTC in 4week(s).     4/1/2021

## 2021-05-10 ENCOUNTER — OFFICE VISIT (OUTPATIENT)
Dept: PODIATRY | Age: 76
End: 2021-05-10
Payer: MEDICARE

## 2021-05-10 VITALS — BODY MASS INDEX: 32.51 KG/M2 | HEIGHT: 72 IN | WEIGHT: 240 LBS

## 2021-05-10 DIAGNOSIS — M65.28 CALCIFIC ACHILLES TENDINITIS OF LEFT LOWER EXTREMITY: Primary | ICD-10-CM

## 2021-05-10 DIAGNOSIS — M79.672 PAIN OF LEFT HEEL: ICD-10-CM

## 2021-05-10 DIAGNOSIS — M92.62 HAGLUND'S DEFORMITY, LEFT: ICD-10-CM

## 2021-05-10 DIAGNOSIS — M77.32 HEEL SPUR, LEFT: ICD-10-CM

## 2021-05-10 PROCEDURE — 99213 OFFICE O/P EST LOW 20 MIN: CPT | Performed by: PODIATRIST

## 2021-05-10 RX ORDER — PREDNISONE 10 MG/1
1 TABLET ORAL DAILY
Qty: 42 EACH | Refills: 0 | Status: SHIPPED | OUTPATIENT
Start: 2021-05-10 | End: 2021-06-28 | Stop reason: SDUPTHER

## 2021-05-10 ASSESSMENT — ENCOUNTER SYMPTOMS
DIARRHEA: 0
NAUSEA: 0
CONSTIPATION: 0
COLOR CHANGE: 0
VOMITING: 0

## 2021-05-10 NOTE — PROGRESS NOTES
[facility]  Return Patient History and Physical    Chief Complaint:   Chief Complaint   Patient presents with    Check-Up     follow up left, still having some pain     Medication Refill     Prednisone       HPI: Rossy De Luna is a 76 y.o. male who presents to the office today for follow up of left heel pain. The 5th double dose of prednisone helped, his pain went to 0, but now at a 1 . The Duexis did not help much. Stretching helps, but he still has pain. The night splint helps, he only wears it 30-40 minutes a day. Asks about using heat. Using topicals    History of complaining of left heel pain, burning. . Currently denies F/C/N/V. Allergies   Allergen Reactions    Lisinopril Other (See Comments)     cough       Past Medical History:   Diagnosis Date    Ankle fracture, right 1993    Surgery w/ hardware placed.  Hypertension     Ringing in ears        Prior to Admission medications    Medication Sig Start Date End Date Taking? Authorizing Provider   predniSONE 10 MG (21) TBPK Take 1 tablet by mouth daily 60mg po on day 1&2, 50mg po on day 3&4, 40mg po on day 5&6, 30mg po on day 7&8,20 mg po on day 9&10, 10mg po on day 11&12 5/10/21  Yes Hiral Han DPM   vitamin D (ERGOCALCIFEROL) 1.25 MG (22050 UT) CAPS capsule Take 1 capsule by mouth once a week 10/1/20  Yes Yulia Parekh MD   vitamin B-12 (CYANOCOBALAMIN) 1000 MCG tablet Take 1,000 mcg by mouth daily. Yes Historical Provider, MD   Ascorbic Acid (VITAMIN C) 500 MG tablet Take 1,000 mg by mouth daily. Yes Historical Provider, MD   aspirin EC 81 MG EC tablet Take 81 mg by mouth daily. Yes Historical Provider, MD   Fiber TABS Take 1 capsule by mouth daily.  CLARIFY WITH PATIENT  4/6/11  Yes Historical Provider, MD       Past Surgical History:   Procedure Laterality Date    CYST REMOVAL  2001    FINGER    FRACTURE SURGERY  1993    Right ankle w/hardware    PILONIDAL CYST EXCISION  1966    TONSILLECTOMY  1950    VASECTOMY        Family History   Problem Relation Age of Onset    Heart Disease Mother     High Blood Pressure Mother     High Blood Pressure Father     Heart Disease Father        Social History     Tobacco Use    Smoking status: Former Smoker     Packs/day: 2.00     Years: 30.00     Pack years: 60.00     Types: Cigarettes     Quit date: 1999     Years since quittin.5    Smokeless tobacco: Never Used   Substance Use Topics    Alcohol use: No       Review of Systems   Constitutional: Negative for chills, diaphoresis, fatigue, fever and unexpected weight change. Cardiovascular: Negative for leg swelling. Gastrointestinal: Negative for constipation, diarrhea, nausea and vomiting. Musculoskeletal: Positive for gait problem. Negative for arthralgias and joint swelling. Skin: Negative for color change, pallor, rash and wound. Neurological: Negative for weakness and numbness. Lower Extremity Physical Examination:     Vitals: There were no vitals filed for this visit. General: AAO x 3 in NAD. Vascular: DP and PT pulses palpable 2/4, Bilateral.  CFT <3 seconds, Bilateral.  Hair growth absent to the level of the digits, Bilateral.  Edema absent, Bilateral.  Varicosities absent, Bilateral. Erythema absent, Bilateral    Neurological:  Sensation present to light touch to level of digits, Bilateral.    Musculoskeletal: Muscle strength 5/5, Left. Pain present upon palpation of insertion of the achilles, prominence present, more pain alteral insertion, Left. normal medial longitudinal arch, Bilateral.  Ankle ROM normal,Left. Integument: Warm, dry, supple, Bilateral.  Open lesion absent, Bilateral.     Radiographs: 2 views left Foot:  Posterior calcaneal exostosis. Dania's deformity. No acute pathology. Gait analysis:     Asessment: Patient is a 76 y.o. male with:   1. Calcific Achilles tendinitis of left lower extremity    2. Dania's deformity, left    3. Heel spur, left    4.  Pain of left heel          Plan: Patient examined and evaluated. Current condition and treatment options discussed in detail. .  Verbal and written instructions given to patient. Contact office with any questions/problems/concerns. Ice daily. Discussed warm/cold contrast treatment. Stretching, HEP info given    Another Prednisone taper, double dose, 12 days  Discussed PT  Night splint    NSAIDS as needed  Discussed topicals    May need MRI, offered today  CAM walker is a possibiltiy, discussed today,   No orders of the defined types were placed in this encounter. Orders Placed This Encounter   Medications    predniSONE 10 MG (21) TBPK     Sig: Take 1 tablet by mouth daily 60mg po on day 1&2, 50mg po on day 3&4, 40mg po on day 5&6, 30mg po on day 7&8,20 mg po on day 9&10, 10mg po on day 11&12     Dispense:  42 each     Refill:  0        RTC in 4week(s).     5/10/2021

## 2021-06-23 DIAGNOSIS — E55.9 VITAMIN D DEFICIENCY: ICD-10-CM

## 2021-06-23 RX ORDER — ERGOCALCIFEROL 1.25 MG/1
50000 CAPSULE ORAL WEEKLY
Qty: 12 CAPSULE | Refills: 3 | Status: SHIPPED | OUTPATIENT
Start: 2021-06-23 | End: 2022-05-31 | Stop reason: SDUPTHER

## 2021-06-28 ENCOUNTER — OFFICE VISIT (OUTPATIENT)
Dept: PODIATRY | Age: 76
End: 2021-06-28
Payer: MEDICARE

## 2021-06-28 VITALS — WEIGHT: 240 LBS | BODY MASS INDEX: 32.51 KG/M2 | HEIGHT: 72 IN

## 2021-06-28 DIAGNOSIS — M92.62 HAGLUND'S DEFORMITY, LEFT: ICD-10-CM

## 2021-06-28 DIAGNOSIS — M77.32 HEEL SPUR, LEFT: ICD-10-CM

## 2021-06-28 DIAGNOSIS — M65.28 CALCIFIC ACHILLES TENDINITIS OF LEFT LOWER EXTREMITY: Primary | ICD-10-CM

## 2021-06-28 PROCEDURE — 99213 OFFICE O/P EST LOW 20 MIN: CPT | Performed by: PODIATRIST

## 2021-06-28 PROCEDURE — G8427 DOCREV CUR MEDS BY ELIG CLIN: HCPCS | Performed by: PODIATRIST

## 2021-06-28 PROCEDURE — 1036F TOBACCO NON-USER: CPT | Performed by: PODIATRIST

## 2021-06-28 PROCEDURE — G8417 CALC BMI ABV UP PARAM F/U: HCPCS | Performed by: PODIATRIST

## 2021-06-28 PROCEDURE — 1123F ACP DISCUSS/DSCN MKR DOCD: CPT | Performed by: PODIATRIST

## 2021-06-28 PROCEDURE — 4040F PNEUMOC VAC/ADMIN/RCVD: CPT | Performed by: PODIATRIST

## 2021-06-28 PROCEDURE — 3017F COLORECTAL CA SCREEN DOC REV: CPT | Performed by: PODIATRIST

## 2021-06-28 RX ORDER — PREDNISONE 10 MG/1
1 TABLET ORAL DAILY
Qty: 42 EACH | Refills: 0 | Status: SHIPPED | OUTPATIENT
Start: 2021-06-28 | End: 2022-07-22

## 2021-06-28 ASSESSMENT — ENCOUNTER SYMPTOMS
COLOR CHANGE: 0
VOMITING: 0
NAUSEA: 0
CONSTIPATION: 0
DIARRHEA: 0

## 2021-06-28 NOTE — PROGRESS NOTES
[facility]  Return Patient History and Physical    Chief Complaint:   Chief Complaint   Patient presents with    Follow-up     Left foot, 5wk f/u - per pt foot pain is about the same as at last visit pain ranging between 1-2 out of 10    Medication Refill     Prednisone 10mg       HPI: Rossy De Luna is a 76 y.o. male who presents to the office today for follow up of left heel pain. The 6th double dose of prednisone helped, his pain went to 0, but now at a 1 . The Duexis did not help much. Stretching helps, but he still has pain. The night splint helps, he only wears it 30-40 minutes a day. Asks about using heat. Using topicals    History of complaining of left heel pain, burning. . Currently denies F/C/N/V. Allergies   Allergen Reactions    Lisinopril Other (See Comments)     cough       Past Medical History:   Diagnosis Date    Ankle fracture, right 1993    Surgery w/ hardware placed.  Hypertension     Ringing in ears        Prior to Admission medications    Medication Sig Start Date End Date Taking? Authorizing Provider   predniSONE 10 MG (21) TBPK Take 1 tablet by mouth daily 60mg po on day 1&2, 50mg po on day 3&4, 40mg po on day 5&6, 30mg po on day 7&8,20 mg po on day 9&10, 10mg po on day 11&12 6/28/21  Yes Hiral Han DPM   vitamin D (ERGOCALCIFEROL) 1.25 MG (91237 UT) CAPS capsule Take 1 capsule by mouth once a week 6/23/21  Yes Yulia Parekh MD   vitamin B-12 (CYANOCOBALAMIN) 1000 MCG tablet Take 1,000 mcg by mouth daily. Yes Historical Provider, MD   Ascorbic Acid (VITAMIN C) 500 MG tablet Take 1,000 mg by mouth daily. Yes Historical Provider, MD   aspirin EC 81 MG EC tablet Take 81 mg by mouth daily. Yes Historical Provider, MD   Fiber TABS Take 1 capsule by mouth daily.  CLARIFY WITH PATIENT  4/6/11  Yes Historical Provider, MD       Past Surgical History:   Procedure Laterality Date    CYST REMOVAL  2001    FINGER    FRACTURE SURGERY  1993    Right ankle w/hardware  PILONIDAL CYST EXCISION  1966    TONSILLECTOMY  1950    VASECTOMY  1980       Family History   Problem Relation Age of Onset    Heart Disease Mother     High Blood Pressure Mother     High Blood Pressure Father     Heart Disease Father        Social History     Tobacco Use    Smoking status: Former Smoker     Packs/day: 2.00     Years: 30.00     Pack years: 60.00     Types: Cigarettes     Quit date: 1999     Years since quittin.6    Smokeless tobacco: Never Used   Substance Use Topics    Alcohol use: No       Review of Systems   Constitutional: Negative for chills, diaphoresis, fatigue, fever and unexpected weight change. Cardiovascular: Negative for leg swelling. Gastrointestinal: Negative for constipation, diarrhea, nausea and vomiting. Musculoskeletal: Positive for gait problem. Negative for arthralgias and joint swelling. Skin: Negative for color change, pallor, rash and wound. Neurological: Negative for weakness and numbness. Lower Extremity Physical Examination:     Vitals: There were no vitals filed for this visit. General: AAO x 3 in NAD. Vascular: DP and PT pulses palpable 2/4, Bilateral.  CFT <3 seconds, Bilateral.  Hair growth absent to the level of the digits, Bilateral.  Edema absent, Bilateral.  Varicosities absent, Bilateral. Erythema absent, Bilateral    Neurological:  Sensation present to light touch to level of digits, Bilateral.    Musculoskeletal: Muscle strength 5/5, Left. Pain present upon palpation of insertion of the achilles, prominence present, more pain alteral insertion, Left. normal medial longitudinal arch, Bilateral.  Ankle ROM normal,Left. Integument: Warm, dry, supple, Bilateral.  Open lesion absent, Bilateral.     Radiographs: 2 views left Foot:  Posterior calcaneal exostosis. Dania's deformity. No acute pathology. Gait analysis:     Asessment: Patient is a 76 y.o. male with:   1.  Calcific Achilles tendinitis of left lower

## 2022-05-25 DIAGNOSIS — E55.9 VITAMIN D DEFICIENCY: ICD-10-CM

## 2022-05-26 RX ORDER — ERGOCALCIFEROL 1.25 MG/1
50000 CAPSULE ORAL WEEKLY
Qty: 12 CAPSULE | Refills: 0 | OUTPATIENT
Start: 2022-05-26

## 2022-05-29 DIAGNOSIS — E55.9 VITAMIN D DEFICIENCY: ICD-10-CM

## 2022-05-31 ENCOUNTER — PATIENT MESSAGE (OUTPATIENT)
Dept: FAMILY MEDICINE CLINIC | Age: 77
End: 2022-05-31

## 2022-05-31 DIAGNOSIS — E55.9 VITAMIN D DEFICIENCY: ICD-10-CM

## 2022-05-31 RX ORDER — ERGOCALCIFEROL 1.25 MG/1
50000 CAPSULE ORAL WEEKLY
Qty: 12 CAPSULE | Refills: 3 | Status: SHIPPED | OUTPATIENT
Start: 2022-05-31 | End: 2022-07-22

## 2022-05-31 RX ORDER — ERGOCALCIFEROL 1.25 MG/1
50000 CAPSULE ORAL WEEKLY
Qty: 12 CAPSULE | Refills: 0 | Status: SHIPPED | OUTPATIENT
Start: 2022-05-31 | End: 2022-07-18 | Stop reason: ALTCHOICE

## 2022-05-31 NOTE — TELEPHONE ENCOUNTER
From: Jamila Herman  Sent: 5/31/2022 11:22 AM EDT  To: [facility] Clinical Support Pool  Subject: refill for my prescription for Vitamin D    THAT'S FINE.  DID YOU DO THE VITAMIN D REFILL FOR NOW? YES, I WILL BE THERE ON JULY 22 at 4 PM    THANKS    LAURENCE PEREZ

## 2022-07-18 NOTE — PROGRESS NOTES
[facility] PHYSICIANS  [facility] FAMILY PHYSICIANS [facility]  [address]  [address] [address]  Dept: [address] [facility] (:  1945) is a 68 y.o. male. Patient is here for evaluation of the following chief complaint(s):  Chief Complaint   Patient presents with    Hypertension        SUBJECTIVE/OBJECTIVE:  PETE Lobato is a 68 y.o. male patient. Patient is an established patient of Dr. Anjelica Mackey . Patient has a known history of elevated BP, peripheral edema, vitamin D Deficiency, and obesity    Patient had not been seen since . ELEVATED BP  Was treated for Hypertension in the past. Lisinopril caused slight cough. [id number] Score for Atrial Fibrillation Stroke Risk   Risk   Factors  Component Value   C CHF No 0   H HTN Yes 1   A2 Age >= 76 Yes,  (79 y.o.) 2   D DM No 0   S2 Prior Stroke/TIA No 0   V Vascular Disease No 0   A Age 74-69 No,  (79 y.o.) 0   Sc Sex male 0    [id number]  Score  3   Score last updated 22 4:05 PM EDT    RISK FOR AFIB  Recommended that he start taking the baby aspirin daily again. He used to take it and quit taking because it was not sure if he needed to. We discussed above table and educated about risk. Patient agreed to start taking it again. BP Readings from Last 3 Encounters:   22 138/86   20 130/80   04/10/19 139/75     PERIPHERAL EDEMA  Bilateral ankle swelling. Right > left. No associated pain or trouble with gait. History of ankle injury and surgery years ago. ELEVATED BLOOD PRESSURE  BP is elevated today. Usually runs around 130s per patient. He was on HCTZ in , low dose. He says his BP improved and so he was taken off of it. We discussed his blood pressure and he agreed that he would start taking HCTZ again for BP control. VITAMIN D  Patient has a known Vitamin D Deficiency. he Usually covered with 12 weeks weekly dose then daily. he is due to get levels check.    We continue to discuss ways to manage this condition. We also discussed ways to improve the levels. Such as learning food groups that are high in Vitamin D. We also discuss that sun exposure is beneficial however, too much sun and sun damage can potentially result in skin cancer. Lab Results   Component Value Date/Time    VITD25 22.6 (L) 07/11/2020 07:15 AM      VITAMIN B 12 /FOLATE DEFICIENCIES  Versa Oppenheim  is not taking Vitamin B12 supplementation. Versa Oppenheim reports not numbness and tingling and fatigue. No results found for: [id number] results found for: FOLATE    WEIGHT  Patient's BMI is Body mass index is 32.14 kg/m². kg/m2. BMI is stable. Patient understands that this condition increases the patient's risk for chronic conditions. Wt Readings from Last 3 Encounters:   07/22/22 237 lb (107.5 kg)   06/28/21 240 lb (108.9 kg)   05/10/21 240 lb (108.9 kg)     DEPRESSION SCREENING: Negative  PHQ Scores 7/22/2022 6/30/2020 2/27/2019 1/24/2019 1/11/2017 12/7/2015 10/31/2014   PHQ2 Score 0 0 0 0 0 0 0   PHQ9 Score 0 0 0 0 0 0 0     VITAL SIGNS:  Vitals:    07/22/22 1529   BP: 138/86   Pulse: 67   Temp: 97.1 °F (36.2 °C)   SpO2: 98%   Weight: 237 lb (107.5 kg)   Height: 6' (1.829 m)   Estimated body mass index is 32.14 kg/m² as calculated from the following:    Height as of this encounter: 6' (1.829 m). Weight as of this encounter: 237 lb (107.5 kg). Review of Systems   Constitutional:  Negative for chills and fever. HENT: Negative. Respiratory: Negative. Negative for shortness of breath and wheezing. Cardiovascular:  Positive for leg swelling. Negative for chest pain and palpitations. Elevated BP   Gastrointestinal:  Negative for abdominal pain. Endocrine: Negative. Musculoskeletal:  Positive for arthralgias. Right ankle pain   Neurological:  Negative for headaches. Psychiatric/Behavioral:  Negative for sleep disturbance. The patient is nervous/anxious. Physical Exam  Vitals and nursing note reviewed.    HENT:      Head: Normocephalic. Nose: Nose normal.   Cardiovascular:      Rate and Rhythm: Normal rate and regular rhythm. Pulmonary:      Effort: Pulmonary effort is normal.   Abdominal:      General: Abdomen is protuberant. There is no distension. Tenderness: There is no abdominal tenderness. Skin:     General: Skin is warm and dry. Neurological:      Mental Status: He is alert. Psychiatric:         Mood and Affect: Mood is anxious. Speech: Speech is not rapid and pressured. Thought Content: Thought content does not include suicidal ideation. MEDICAL HISTORY      Diagnosis Date    Ankle fracture, right 1993    Surgery w/ hardware placed. Hypertension     Ringing in ears       MEDICATIONS  Prior to Visit Medications    Medication Sig Taking? Authorizing Provider   hydroCHLOROthiazide (HYDRODIURIL) 12.5 MG tablet Take 1 tablet by mouth every morning Yes BRANDY Peraza CNP   Blood Pressure Monitor KIT Use as directed. Yes BRANDY Peraza CNP   aspirin EC 81 MG EC tablet Take 1 tablet by mouth in the morning. Yes BRANDY Peraza CNP   vitamin B-12 (CYANOCOBALAMIN) 1000 MCG tablet Take 1,000 mcg by mouth daily. Yes Historical Provider, MD   Ascorbic Acid (VITAMIN C) 500 MG tablet Take 1,000 mg by mouth daily. Yes Historical Provider, MD   Fiber TABS Take 1 capsule by mouth daily. CLARIFY WITH PATIENT  Yes Historical Provider, MD     Controlled Substance Monitoring:  Acute and Chronic Pain Monitoring:   No flowsheet data found. ASSESSMENT/PLAN:  1. Peripheral edema  Worsening  Start HCTZ  DISCUSSED AND ADVISED TO:  Raise legs above the swollen area when resting. Take frequent breaks from standing and sitting positions. Walk around to promote blood flow to legs. Wear support stockings as discussed   Cut down salt on diet. - hydroCHLOROthiazide (HYDRODIURIL) 12.5 MG tablet; Take 1 tablet by mouth every morning  Dispense: 30 tablet;  Refill: 0  - aspirin Future  - Hemoglobin A1C; Future  - Urinalysis with Reflex to Culture; Future    7. Elevated LDL cholesterol level  Failure to Improve  Continue to evaluate  Recommend labs    - Comprehensive Metabolic Panel, Fasting; Future  - Lipid, Fasting; Future    8. Encounter for vitamin deficiency screening  Failure to Improve  Continue to evaluate  Recommend labs    - PSA Screening; Future    9. Encounter for screening for malignant neoplasm of prostate   Failure to Improve  Continue to evaluate  Recommend labs    - PSA Screening; Future   On this date 7/22/2022 I have spent 35 minutes reviewing previous notes, test results and face to face with the patient discussing the diagnosis and importance of compliance with the treatment plan as well as documenting on the day of the visit. Return in about 4 months (around 11/22/2022) for Appt w/ Dr. Tatum Peter, Chronic conditions, 30mins. This note was completed by using the assistance of a speech-recognition program. However, inadvertent computerized transcription errors may be present. Although every effort was made to ensure accuracy, no guarantees can be provided that every mistake has been identified and corrected by editing.   Electronically signed by BRANDY Alcala CNP on 7/17/22 at 8:16 PM EDT     --BRANDY Alcala CNP

## 2022-07-19 NOTE — PATIENT INSTRUCTIONS
New Updates for [facility] MyChart/ [employer] ([facility]) DIMA    Thank you for choosing US to give you the best care! [employer] ([facility]) is always trying to think of new ways to help their patients. We are asking all patients to try out the new digital registration that is now available through your [facility] account or the new DIMA, [employer] ([facility]). Via the dima you're now able to update your personal and registration information prior to your upcoming appointment. This will save you time once you arrive at the office to check-in, not to mention your information remains safe!! Many other perks come from signing up for an account, such as:  Requesting refills  Scheduling an appointment  Completing an E-Visit  Sending a message to the office/provider  Having access to your medication list  Paying your bill/copay prior to your appointment  Scheduling your yearly mammogram  Review your test results    If you are not familiar with [facility] or the [employer] ([facility]) DIMA, please ask one of us and we will be happy to answer any questions or help you set-up your account.       Your [facility] office,  [place]

## 2022-07-22 ENCOUNTER — OFFICE VISIT (OUTPATIENT)
Dept: FAMILY MEDICINE CLINIC | Age: 77
End: 2022-07-22
Payer: MEDICARE

## 2022-07-22 VITALS
DIASTOLIC BLOOD PRESSURE: 86 MMHG | HEIGHT: 72 IN | SYSTOLIC BLOOD PRESSURE: 138 MMHG | BODY MASS INDEX: 32.1 KG/M2 | WEIGHT: 237 LBS | TEMPERATURE: 97.1 F | OXYGEN SATURATION: 98 % | HEART RATE: 67 BPM

## 2022-07-22 DIAGNOSIS — M19.071 ARTHRITIS OF RIGHT ANKLE: ICD-10-CM

## 2022-07-22 DIAGNOSIS — R60.9 PERIPHERAL EDEMA: Primary | ICD-10-CM

## 2022-07-22 DIAGNOSIS — Z13.21 ENCOUNTER FOR VITAMIN DEFICIENCY SCREENING: ICD-10-CM

## 2022-07-22 DIAGNOSIS — R73.9 HYPERGLYCEMIA: ICD-10-CM

## 2022-07-22 DIAGNOSIS — R03.0 ELEVATED BLOOD PRESSURE READING: ICD-10-CM

## 2022-07-22 DIAGNOSIS — Z12.5 ENCOUNTER FOR SCREENING FOR MALIGNANT NEOPLASM OF PROSTATE: ICD-10-CM

## 2022-07-22 DIAGNOSIS — E78.00 ELEVATED LDL CHOLESTEROL LEVEL: ICD-10-CM

## 2022-07-22 DIAGNOSIS — E55.9 VITAMIN D DEFICIENCY: ICD-10-CM

## 2022-07-22 DIAGNOSIS — E66.09 CLASS 1 OBESITY DUE TO EXCESS CALORIES WITH SERIOUS COMORBIDITY AND BODY MASS INDEX (BMI) OF 32.0 TO 32.9 IN ADULT: ICD-10-CM

## 2022-07-22 PROBLEM — R60.0 PERIPHERAL EDEMA: Status: ACTIVE | Noted: 2022-07-22

## 2022-07-22 PROCEDURE — G8417 CALC BMI ABV UP PARAM F/U: HCPCS | Performed by: FAMILY MEDICINE

## 2022-07-22 PROCEDURE — G8427 DOCREV CUR MEDS BY ELIG CLIN: HCPCS | Performed by: FAMILY MEDICINE

## 2022-07-22 PROCEDURE — 99214 OFFICE O/P EST MOD 30 MIN: CPT | Performed by: FAMILY MEDICINE

## 2022-07-22 PROCEDURE — 1123F ACP DISCUSS/DSCN MKR DOCD: CPT | Performed by: FAMILY MEDICINE

## 2022-07-22 PROCEDURE — 81003 URINALYSIS AUTO W/O SCOPE: CPT | Performed by: FAMILY MEDICINE

## 2022-07-22 PROCEDURE — 3288F FALL RISK ASSESSMENT DOCD: CPT | Performed by: FAMILY MEDICINE

## 2022-07-22 PROCEDURE — 1036F TOBACCO NON-USER: CPT | Performed by: FAMILY MEDICINE

## 2022-07-22 RX ORDER — ASPIRIN 81 MG/1
81 TABLET ORAL DAILY
Qty: 90 TABLET | Refills: 2
Start: 2022-07-22

## 2022-07-22 RX ORDER — BLOOD PRESSURE TEST KIT
KIT MISCELLANEOUS
Qty: 1 KIT | Refills: 1 | Status: SHIPPED | OUTPATIENT
Start: 2022-07-22

## 2022-07-22 RX ORDER — HYDROCHLOROTHIAZIDE 12.5 MG/1
12.5 TABLET ORAL EVERY MORNING
Qty: 30 TABLET | Refills: 0 | Status: SHIPPED | OUTPATIENT
Start: 2022-07-22 | End: 2022-08-09 | Stop reason: SDUPTHER

## 2022-07-22 SDOH — ECONOMIC STABILITY: TRANSPORTATION INSECURITY
IN THE PAST 12 MONTHS, HAS LACK OF TRANSPORTATION KEPT YOU FROM MEETINGS, WORK, OR FROM GETTING THINGS NEEDED FOR DAILY LIVING?: NO

## 2022-07-22 SDOH — ECONOMIC STABILITY: HOUSING INSECURITY
IN THE LAST 12 MONTHS, WAS THERE A TIME WHEN YOU DID NOT HAVE A STEADY PLACE TO SLEEP OR SLEPT IN A SHELTER (INCLUDING NOW)?: NO

## 2022-07-22 SDOH — ECONOMIC STABILITY: INCOME INSECURITY: IN THE LAST 12 MONTHS, WAS THERE A TIME WHEN YOU WERE NOT ABLE TO PAY THE MORTGAGE OR RENT ON TIME?: NO

## 2022-07-22 SDOH — ECONOMIC STABILITY: FOOD INSECURITY: WITHIN THE PAST 12 MONTHS, THE FOOD YOU BOUGHT JUST DIDN'T LAST AND YOU DIDN'T HAVE MONEY TO GET MORE.: NEVER TRUE

## 2022-07-22 SDOH — ECONOMIC STABILITY: TRANSPORTATION INSECURITY
IN THE PAST 12 MONTHS, HAS THE LACK OF TRANSPORTATION KEPT YOU FROM MEDICAL APPOINTMENTS OR FROM GETTING MEDICATIONS?: NO

## 2022-07-22 SDOH — ECONOMIC STABILITY: FOOD INSECURITY: WITHIN THE PAST 12 MONTHS, YOU WORRIED THAT YOUR FOOD WOULD RUN OUT BEFORE YOU GOT MONEY TO BUY MORE.: NEVER TRUE

## 2022-07-22 ASSESSMENT — PATIENT HEALTH QUESTIONNAIRE - PHQ9
SUM OF ALL RESPONSES TO PHQ QUESTIONS 1-9: 0
SUM OF ALL RESPONSES TO PHQ QUESTIONS 1-9: 0
2. FEELING DOWN, DEPRESSED OR HOPELESS: 0
SUM OF ALL RESPONSES TO PHQ9 QUESTIONS 1 & 2: 0
SUM OF ALL RESPONSES TO PHQ QUESTIONS 1-9: 0
SUM OF ALL RESPONSES TO PHQ QUESTIONS 1-9: 0
1. LITTLE INTEREST OR PLEASURE IN DOING THINGS: 0

## 2022-07-22 ASSESSMENT — SOCIAL DETERMINANTS OF HEALTH (SDOH): HOW HARD IS IT FOR YOU TO PAY FOR THE VERY BASICS LIKE FOOD, HOUSING, MEDICAL CARE, AND HEATING?: NOT HARD AT ALL

## 2022-07-22 ASSESSMENT — ENCOUNTER SYMPTOMS
ABDOMINAL PAIN: 0
SHORTNESS OF BREATH: 0
RESPIRATORY NEGATIVE: 1
WHEEZING: 0

## 2022-07-30 ENCOUNTER — HOSPITAL ENCOUNTER (OUTPATIENT)
Age: 77
Discharge: HOME | End: 2022-07-30
Payer: MEDICARE

## 2022-07-30 DIAGNOSIS — Z13.21 ENCOUNTER FOR VITAMIN DEFICIENCY SCREENING: ICD-10-CM

## 2022-07-30 DIAGNOSIS — E55.9 VITAMIN D DEFICIENCY: ICD-10-CM

## 2022-07-30 DIAGNOSIS — Z12.5 ENCOUNTER FOR SCREENING FOR MALIGNANT NEOPLASM OF PROSTATE: ICD-10-CM

## 2022-07-30 DIAGNOSIS — R73.9 HYPERGLYCEMIA: ICD-10-CM

## 2022-07-30 DIAGNOSIS — E78.00 ELEVATED LDL CHOLESTEROL LEVEL: ICD-10-CM

## 2022-07-30 LAB
ABSOLUTE EOS #: 0.2 K/UL (ref 0–0.4)
ABSOLUTE LYMPH #: 1.7 K/UL (ref 1–4.8)
ABSOLUTE MONO #: 0.7 K/UL (ref 0.1–1.3)
ALBUMIN SERPL-MCNC: 4.5 G/DL (ref 3.5–5.2)
ALP BLD-CCNC: 87 U/L (ref 40–129)
ALT SERPL-CCNC: 17 U/L (ref 5–41)
ANION GAP SERPL CALCULATED.3IONS-SCNC: 12 MMOL/L (ref 9–17)
AST SERPL-CCNC: 22 U/L
BASOPHILS # BLD: 1 % (ref 0–2)
BASOPHILS ABSOLUTE: 0 K/UL (ref 0–0.2)
BILIRUB SERPL-MCNC: 0.97 MG/DL (ref 0.3–1.2)
BILIRUBIN URINE: NEGATIVE
BUN BLDV-MCNC: 18 MG/DL (ref 8–23)
CALCIUM SERPL-MCNC: 9.3 MG/DL (ref 8.6–10.4)
CHLORIDE BLD-SCNC: 106 MMOL/L (ref 98–107)
CHOLESTEROL, FASTING: 152 MG/DL
CHOLESTEROL/HDL RATIO: 3.5
CO2: 26 MMOL/L (ref 20–31)
COLOR: YELLOW
COMMENT UA: NORMAL
CREAT SERPL-MCNC: 0.76 MG/DL (ref 0.7–1.2)
EOSINOPHILS RELATIVE PERCENT: 3 % (ref 0–4)
FOLATE: 10.4 NG/ML
GFR AFRICAN AMERICAN: >60 ML/MIN
GFR NON-AFRICAN AMERICAN: >60 ML/MIN
GFR SERPL CREATININE-BSD FRML MDRD: ABNORMAL ML/MIN/{1.73_M2}
GLUCOSE FASTING: 130 MG/DL (ref 70–99)
GLUCOSE URINE: NEGATIVE
HCT VFR BLD CALC: 41.9 % (ref 41–53)
HDLC SERPL-MCNC: 44 MG/DL
HEMOGLOBIN: 14.9 G/DL (ref 13.5–17.5)
KETONES, URINE: NEGATIVE
LDL CHOLESTEROL: 89 MG/DL (ref 0–130)
LEUKOCYTE ESTERASE, URINE: NEGATIVE
LYMPHOCYTES # BLD: 27 % (ref 24–44)
MCH RBC QN AUTO: 33 PG (ref 26–34)
MCHC RBC AUTO-ENTMCNC: 35.7 G/DL (ref 31–37)
MCV RBC AUTO: 92.5 FL (ref 80–100)
MONOCYTES # BLD: 11 % (ref 1–7)
NITRITE, URINE: NEGATIVE
PDW BLD-RTO: 13.1 % (ref 11.5–14.9)
PH UA: 7 (ref 5–8)
PLATELET # BLD: 234 K/UL (ref 150–450)
PMV BLD AUTO: 8.3 FL (ref 6–12)
POTASSIUM SERPL-SCNC: 4.9 MMOL/L (ref 3.7–5.3)
PROSTATE SPECIFIC ANTIGEN: 3.77 NG/ML
PROTEIN UA: NEGATIVE
RBC # BLD: 4.52 M/UL (ref 4.5–5.9)
SEG NEUTROPHILS: 58 % (ref 36–66)
SEGMENTED NEUTROPHILS ABSOLUTE COUNT: 3.6 K/UL (ref 1.3–9.1)
SODIUM BLD-SCNC: 144 MMOL/L (ref 135–144)
SPECIFIC GRAVITY UA: 1.02 (ref 1–1.03)
TOTAL PROTEIN: 7.1 G/DL (ref 6.4–8.3)
TRIGLYCERIDE, FASTING: 95 MG/DL
TURBIDITY: CLEAR
URINE HGB: NEGATIVE
UROBILINOGEN, URINE: NORMAL
VITAMIN B-12: 1392 PG/ML (ref 232–1245)
VITAMIN D 25-HYDROXY: 46.7 NG/ML
WBC # BLD: 6.2 K/UL (ref 3.5–11)

## 2022-07-30 PROCEDURE — 82746 ASSAY OF FOLIC ACID SERUM: CPT

## 2022-07-30 PROCEDURE — G0103 PSA SCREENING: HCPCS

## 2022-07-30 PROCEDURE — 82306 VITAMIN D 25 HYDROXY: CPT

## 2022-07-30 PROCEDURE — 80061 LIPID PANEL: CPT

## 2022-07-30 PROCEDURE — 36415 COLL VENOUS BLD VENIPUNCTURE: CPT

## 2022-07-30 PROCEDURE — 83036 HEMOGLOBIN GLYCOSYLATED A1C: CPT

## 2022-07-30 PROCEDURE — 82607 VITAMIN B-12: CPT

## 2022-07-30 PROCEDURE — 80053 COMPREHEN METABOLIC PANEL: CPT

## 2022-07-30 PROCEDURE — 85025 COMPLETE CBC W/AUTO DIFF WBC: CPT

## 2022-07-30 PROCEDURE — 81003 URINALYSIS AUTO W/O SCOPE: CPT

## 2022-07-31 ENCOUNTER — TELEPHONE (OUTPATIENT)
Dept: FAMILY MEDICINE CLINIC | Age: 77
End: 2022-07-31

## 2022-07-31 LAB
ESTIMATED AVERAGE GLUCOSE: 111 MG/DL
HBA1C MFR BLD: 5.5 % (ref 4–6)

## 2022-08-01 NOTE — TELEPHONE ENCOUNTER
PT INFORMED
VITD25 46.7 07/30/2022 07:45 AM         Patient's lipids test results stable but  Low good cholesterol  Please advise the patient to:  Cut down on red meats and trans fats in their diet. Increase physical activity. Add some regular exercise at least 3 times a week on their routine  Try to lose weight to help improve their cholesterol levels.      Lab Results   Component Value Date/Time    CHOLFAST 152 07/30/2022 07:45 AM    CHOL 141 07/18/2020 06:59 AM    HDL 44 07/30/2022 07:45 AM    LDLCHOLESTEROL 89 07/30/2022 07:45 AM    TRIG 84 07/18/2020 06:59 AM    CHOLHDLRATIO 3.5 07/30/2022 07:45 AM    VLDL NOT REPORTED 07/18/2020 06:59 AM

## 2022-08-09 DIAGNOSIS — R60.9 PERIPHERAL EDEMA: ICD-10-CM

## 2022-08-09 RX ORDER — HYDROCHLOROTHIAZIDE 12.5 MG/1
12.5 TABLET ORAL EVERY MORNING
Qty: 90 TABLET | Refills: 2 | Status: SHIPPED | OUTPATIENT
Start: 2022-08-09

## 2023-05-13 ENCOUNTER — HOSPITAL ENCOUNTER (OUTPATIENT)
Age: 78
Discharge: HOME | End: 2023-05-13
Payer: MEDICARE

## 2023-05-13 DIAGNOSIS — I10 PRIMARY HYPERTENSION: ICD-10-CM

## 2023-05-13 LAB
ABSOLUTE EOS #: 0.2 K/UL (ref 0–0.4)
ABSOLUTE LYMPH #: 1.7 K/UL (ref 1–4.8)
ABSOLUTE MONO #: 0.8 K/UL (ref 0.1–1.3)
ALBUMIN SERPL-MCNC: 4.3 G/DL (ref 3.5–5.2)
ALP SERPL-CCNC: 74 U/L (ref 40–129)
ALT SERPL-CCNC: 13 U/L (ref 5–41)
ANION GAP SERPL CALCULATED.3IONS-SCNC: 8 MMOL/L (ref 9–17)
AST SERPL-CCNC: 14 U/L
BASOPHILS # BLD: 1 % (ref 0–2)
BASOPHILS ABSOLUTE: 0 K/UL (ref 0–0.2)
BILIRUB SERPL-MCNC: 1 MG/DL (ref 0.3–1.2)
BUN SERPL-MCNC: 22 MG/DL (ref 8–23)
CALCIUM SERPL-MCNC: 9.1 MG/DL (ref 8.6–10.4)
CHLORIDE SERPL-SCNC: 104 MMOL/L (ref 98–107)
CHOLEST SERPL-MCNC: 147 MG/DL
CHOLESTEROL/HDL RATIO: 3.6
CO2 SERPL-SCNC: 28 MMOL/L (ref 20–31)
CREAT SERPL-MCNC: 0.82 MG/DL (ref 0.7–1.2)
EOSINOPHILS RELATIVE PERCENT: 3 % (ref 0–4)
GFR SERPL CREATININE-BSD FRML MDRD: >60 ML/MIN/1.73M2
GLUCOSE SERPL-MCNC: 108 MG/DL (ref 70–99)
HCT VFR BLD AUTO: 43.2 % (ref 41–53)
HDLC SERPL-MCNC: 41 MG/DL
HGB BLD-MCNC: 15.2 G/DL (ref 13.5–17.5)
LDLC SERPL CALC-MCNC: 87 MG/DL (ref 0–130)
LYMPHOCYTES # BLD: 24 % (ref 24–44)
MCH RBC QN AUTO: 33 PG (ref 26–34)
MCHC RBC AUTO-ENTMCNC: 35.3 G/DL (ref 31–37)
MCV RBC AUTO: 93.5 FL (ref 80–100)
MONOCYTES # BLD: 11 % (ref 1–7)
PDW BLD-RTO: 13.3 % (ref 11.5–14.9)
PLATELET # BLD AUTO: 219 K/UL (ref 150–450)
PMV BLD AUTO: 7.6 FL (ref 6–12)
POTASSIUM SERPL-SCNC: 4.7 MMOL/L (ref 3.7–5.3)
PROT SERPL-MCNC: 6.8 G/DL (ref 6.4–8.3)
RBC # BLD: 4.62 M/UL (ref 4.5–5.9)
SEG NEUTROPHILS: 61 % (ref 36–66)
SEGMENTED NEUTROPHILS ABSOLUTE COUNT: 4.4 K/UL (ref 1.3–9.1)
SODIUM SERPL-SCNC: 140 MMOL/L (ref 135–144)
TRIGL SERPL-MCNC: 93 MG/DL
WBC # BLD AUTO: 7.1 K/UL (ref 3.5–11)

## 2023-05-13 PROCEDURE — 85025 COMPLETE CBC W/AUTO DIFF WBC: CPT

## 2023-05-13 PROCEDURE — 80053 COMPREHEN METABOLIC PANEL: CPT

## 2023-05-13 PROCEDURE — 80061 LIPID PANEL: CPT

## 2023-05-13 PROCEDURE — 36415 COLL VENOUS BLD VENIPUNCTURE: CPT

## 2023-09-20 ENCOUNTER — HOSPITAL ENCOUNTER (OUTPATIENT)
Age: 78
Setting detail: SPECIMEN
Discharge: HOME | End: 2023-09-20

## 2023-09-20 ENCOUNTER — NURSE ONLY (OUTPATIENT)
Dept: FAMILY MEDICINE CLINIC | Age: 78
End: 2023-09-20
Payer: MEDICARE

## 2023-09-20 DIAGNOSIS — J06.9 VIRAL URI: Primary | ICD-10-CM

## 2023-09-20 LAB
INFLUENZA A ANTIBODY: NEGATIVE
INFLUENZA B ANTIBODY: NEGATIVE

## 2023-09-20 PROCEDURE — 87804 INFLUENZA ASSAY W/OPTIC: CPT

## 2023-09-20 PROCEDURE — 99211 OFF/OP EST MAY X REQ PHY/QHP: CPT

## 2023-09-21 DIAGNOSIS — J06.9 VIRAL URI: ICD-10-CM

## 2023-09-21 LAB
SARS-COV-2 RNA RESP QL NAA+PROBE: ABNORMAL
SARS-COV-2 RNA RESP QL NAA+PROBE: DETECTED
SOURCE: ABNORMAL

## 2023-09-22 PROBLEM — U07.1 COVID-19: Status: ACTIVE | Noted: 2023-09-22

## 2023-11-11 ENCOUNTER — HOSPITAL ENCOUNTER (OUTPATIENT)
Age: 78
Discharge: HOME | End: 2023-11-11
Payer: MEDICARE

## 2023-11-11 DIAGNOSIS — I10 PRIMARY HYPERTENSION: ICD-10-CM

## 2023-11-11 LAB
ALBUMIN SERPL-MCNC: 4 G/DL (ref 3.5–5.2)
ALP SERPL-CCNC: 81 U/L (ref 40–129)
ALT SERPL-CCNC: 12 U/L (ref 5–41)
ANION GAP SERPL CALCULATED.3IONS-SCNC: 9 MMOL/L (ref 9–17)
AST SERPL-CCNC: 22 U/L
BASOPHILS # BLD: 0 K/UL (ref 0–0.2)
BASOPHILS NFR BLD: 0 % (ref 0–2)
BILIRUB SERPL-MCNC: 1 MG/DL (ref 0.3–1.2)
BUN SERPL-MCNC: 19 MG/DL (ref 8–23)
CALCIUM SERPL-MCNC: 9 MG/DL (ref 8.6–10.4)
CHLORIDE SERPL-SCNC: 108 MMOL/L (ref 98–107)
CHOLEST SERPL-MCNC: 141 MG/DL
CHOLESTEROL/HDL RATIO: 3.4
CO2 SERPL-SCNC: 28 MMOL/L (ref 20–31)
CREAT SERPL-MCNC: 0.7 MG/DL (ref 0.7–1.2)
EOSINOPHIL # BLD: 0.2 K/UL (ref 0–0.4)
EOSINOPHILS RELATIVE PERCENT: 3 % (ref 0–4)
ERYTHROCYTE [DISTWIDTH] IN BLOOD BY AUTOMATED COUNT: 13.6 % (ref 11.5–14.9)
GFR SERPL CREATININE-BSD FRML MDRD: >60 ML/MIN/1.73M2
GLUCOSE SERPL-MCNC: 118 MG/DL (ref 70–99)
HCT VFR BLD AUTO: 41.4 % (ref 41–53)
HDLC SERPL-MCNC: 42 MG/DL
HGB BLD-MCNC: 14.2 G/DL (ref 13.5–17.5)
LDLC SERPL CALC-MCNC: 79 MG/DL (ref 0–130)
LYMPHOCYTES NFR BLD: 1.3 K/UL (ref 1–4.8)
LYMPHOCYTES RELATIVE PERCENT: 18 % (ref 24–44)
MCH RBC QN AUTO: 33.1 PG (ref 26–34)
MCHC RBC AUTO-ENTMCNC: 34.3 G/DL (ref 31–37)
MCV RBC AUTO: 96.6 FL (ref 80–100)
MONOCYTES NFR BLD: 0.9 K/UL (ref 0.1–1.3)
MONOCYTES NFR BLD: 12 % (ref 1–7)
NEUTROPHILS NFR BLD: 67 % (ref 36–66)
NEUTS SEG NFR BLD: 5 K/UL (ref 1.3–9.1)
PLATELET # BLD AUTO: 218 K/UL (ref 150–450)
PMV BLD AUTO: 8 FL (ref 6–12)
POTASSIUM SERPL-SCNC: 4.8 MMOL/L (ref 3.7–5.3)
PROT SERPL-MCNC: 6.7 G/DL (ref 6.4–8.3)
RBC # BLD AUTO: 4.29 M/UL (ref 4.5–5.9)
SODIUM SERPL-SCNC: 145 MMOL/L (ref 135–144)
TRIGL SERPL-MCNC: 98 MG/DL
WBC OTHER # BLD: 7.5 K/UL (ref 3.5–11)

## 2023-11-11 PROCEDURE — 80061 LIPID PANEL: CPT

## 2023-11-11 PROCEDURE — 36415 COLL VENOUS BLD VENIPUNCTURE: CPT

## 2023-11-11 PROCEDURE — 80053 COMPREHEN METABOLIC PANEL: CPT

## 2023-11-11 PROCEDURE — 85025 COMPLETE CBC W/AUTO DIFF WBC: CPT

## 2024-05-11 ENCOUNTER — HOSPITAL ENCOUNTER (OUTPATIENT)
Age: 79
Discharge: HOME | End: 2024-05-11
Payer: MEDICARE

## 2024-05-11 DIAGNOSIS — I10 PRIMARY HYPERTENSION: ICD-10-CM

## 2024-05-11 LAB
ANION GAP SERPL CALCULATED.3IONS-SCNC: 9 MMOL/L (ref 9–17)
BASOPHILS # BLD: 0 K/UL (ref 0–0.2)
BASOPHILS NFR BLD: 0 % (ref 0–2)
BUN SERPL-MCNC: 19 MG/DL (ref 8–23)
CALCIUM SERPL-MCNC: 9.2 MG/DL (ref 8.6–10.4)
CHLORIDE SERPL-SCNC: 104 MMOL/L (ref 98–107)
CO2 SERPL-SCNC: 27 MMOL/L (ref 20–31)
CREAT SERPL-MCNC: 0.8 MG/DL (ref 0.7–1.2)
EOSINOPHIL # BLD: 0.2 K/UL (ref 0–0.4)
EOSINOPHILS RELATIVE PERCENT: 2 % (ref 0–4)
GFR, ESTIMATED: >90 ML/MIN/1.73M2
GLUCOSE SERPL-MCNC: 147 MG/DL (ref 70–99)
HCT VFR BLD AUTO: 42.6 % (ref 41–53)
HGB BLD-MCNC: 14.9 G/DL (ref 13.5–17.5)
LYMPHOCYTES NFR BLD: 1.5 K/UL (ref 1–4.8)
LYMPHOCYTES RELATIVE PERCENT: 20 % (ref 24–44)
MCH RBC QN AUTO: 33.1 PG (ref 26–34)
MCHC RBC AUTO-ENTMCNC: 34.9 G/DL (ref 31–37)
MCV RBC AUTO: 95 FL (ref 80–100)
MONOCYTES NFR BLD: 0.7 K/UL (ref 0.1–1.3)
MONOCYTES NFR BLD: 9 % (ref 1–7)
NEUTROPHILS NFR BLD: 69 % (ref 36–66)
NEUTS SEG NFR BLD: 5.1 K/UL (ref 1.3–9.1)
PLATELET # BLD AUTO: 217 K/UL (ref 150–450)
PMV BLD AUTO: 7.7 FL (ref 6–12)
POTASSIUM SERPL-SCNC: 4.2 MMOL/L (ref 3.7–5.3)
RBC # BLD AUTO: 4.49 M/UL (ref 4.5–5.9)
SODIUM SERPL-SCNC: 140 MMOL/L (ref 135–144)
WBC OTHER # BLD: 7.5 K/UL (ref 3.5–11)

## 2024-05-11 PROCEDURE — 85025 COMPLETE CBC W/AUTO DIFF WBC: CPT

## 2024-05-11 PROCEDURE — 80048 BASIC METABOLIC PNL TOTAL CA: CPT

## 2024-05-11 PROCEDURE — 36415 COLL VENOUS BLD VENIPUNCTURE: CPT

## 2024-11-13 ENCOUNTER — HOSPITAL ENCOUNTER (OUTPATIENT)
Age: 79
Setting detail: SPECIMEN
Discharge: HOME | End: 2024-11-13

## 2024-11-13 DIAGNOSIS — I10 PRIMARY HYPERTENSION: ICD-10-CM

## 2024-11-13 DIAGNOSIS — R73.09 ELEVATED GLUCOSE: ICD-10-CM

## 2024-11-13 LAB
ANION GAP SERPL CALCULATED.3IONS-SCNC: 8 MMOL/L (ref 9–16)
BASOPHILS # BLD: 0.04 K/UL (ref 0–0.2)
BASOPHILS NFR BLD: 1 % (ref 0–2)
BUN SERPL-MCNC: 18 MG/DL (ref 8–23)
CALCIUM SERPL-MCNC: 9.2 MG/DL (ref 8.6–10.4)
CHLORIDE SERPL-SCNC: 106 MMOL/L (ref 98–107)
CHOLEST SERPL-MCNC: 152 MG/DL (ref 0–199)
CHOLESTEROL/HDL RATIO: 3.5
CO2 SERPL-SCNC: 28 MMOL/L (ref 20–31)
CREAT SERPL-MCNC: 0.9 MG/DL (ref 0.7–1.2)
EOSINOPHIL # BLD: 0.24 K/UL (ref 0–0.44)
EOSINOPHILS RELATIVE PERCENT: 4 % (ref 1–4)
ERYTHROCYTE [DISTWIDTH] IN BLOOD BY AUTOMATED COUNT: 13.2 % (ref 11.8–14.4)
EST. AVERAGE GLUCOSE BLD GHB EST-MCNC: 108 MG/DL
GFR, ESTIMATED: 87 ML/MIN/1.73M2
GLUCOSE SERPL-MCNC: 103 MG/DL (ref 74–99)
HBA1C MFR BLD: 5.4 % (ref 4–6)
HCT VFR BLD AUTO: 45.5 % (ref 40.7–50.3)
HDLC SERPL-MCNC: 43 MG/DL
HGB BLD-MCNC: 14.9 G/DL (ref 13–17)
IMM GRANULOCYTES # BLD AUTO: <0.03 K/UL (ref 0–0.3)
IMM GRANULOCYTES NFR BLD: 0 %
LDLC SERPL CALC-MCNC: 88 MG/DL (ref 0–100)
LYMPHOCYTES NFR BLD: 2.04 K/UL (ref 1.1–3.7)
LYMPHOCYTES RELATIVE PERCENT: 32 % (ref 24–43)
MCH RBC QN AUTO: 31.7 PG (ref 25.2–33.5)
MCHC RBC AUTO-ENTMCNC: 32.7 G/DL (ref 28.4–34.8)
MCV RBC AUTO: 96.8 FL (ref 82.6–102.9)
MONOCYTES NFR BLD: 0.82 K/UL (ref 0.1–1.2)
MONOCYTES NFR BLD: 13 % (ref 3–12)
NEUTROPHILS NFR BLD: 50 % (ref 36–65)
NEUTS SEG NFR BLD: 3.31 K/UL (ref 1.5–8.1)
NRBC BLD-RTO: 0 PER 100 WBC
PLATELET # BLD AUTO: 233 K/UL (ref 138–453)
PMV BLD AUTO: 10.5 FL (ref 8.1–13.5)
POTASSIUM SERPL-SCNC: 5 MMOL/L (ref 3.7–5.3)
RBC # BLD AUTO: 4.7 M/UL (ref 4.21–5.77)
SODIUM SERPL-SCNC: 142 MMOL/L (ref 136–145)
TRIGL SERPL-MCNC: 106 MG/DL (ref 0–149)
VLDLC SERPL CALC-MCNC: 21 MG/DL (ref 1–30)
WBC OTHER # BLD: 6.5 K/UL (ref 3.5–11.3)

## 2025-05-13 ENCOUNTER — HOSPITAL ENCOUNTER (OUTPATIENT)
Dept: GENERAL RADIOLOGY | Facility: CLINIC | Age: 80
Discharge: HOME | End: 2025-05-15
Payer: MEDICARE

## 2025-05-13 DIAGNOSIS — K59.00 CONSTIPATION, UNSPECIFIED CONSTIPATION TYPE: ICD-10-CM

## 2025-05-13 PROCEDURE — 74018 RADEX ABDOMEN 1 VIEW: CPT

## 2025-05-17 ENCOUNTER — HOSPITAL ENCOUNTER (OUTPATIENT)
Age: 80
Discharge: HOME | End: 2025-05-17
Payer: MEDICARE

## 2025-05-17 DIAGNOSIS — I10 PRIMARY HYPERTENSION: ICD-10-CM

## 2025-05-17 LAB
ALBUMIN SERPL-MCNC: 4 G/DL (ref 3.5–5.2)
ALP SERPL-CCNC: 75 U/L (ref 40–129)
ALT SERPL-CCNC: 14 U/L (ref 10–50)
ANION GAP SERPL CALCULATED.3IONS-SCNC: 10 MMOL/L (ref 9–16)
AST SERPL-CCNC: 18 U/L (ref 10–50)
BASOPHILS # BLD: 0.04 K/UL (ref 0–0.2)
BASOPHILS NFR BLD: 1 % (ref 0–2)
BILIRUB SERPL-MCNC: 1.4 MG/DL (ref 0–1.2)
BUN SERPL-MCNC: 13 MG/DL (ref 8–23)
CALCIUM SERPL-MCNC: 8.9 MG/DL (ref 8.6–10.4)
CHLORIDE SERPL-SCNC: 105 MMOL/L (ref 98–107)
CHOLEST SERPL-MCNC: 113 MG/DL (ref 0–199)
CHOLESTEROL/HDL RATIO: 3
CO2 SERPL-SCNC: 25 MMOL/L (ref 20–31)
CREAT SERPL-MCNC: 0.9 MG/DL (ref 0.7–1.2)
EOSINOPHIL # BLD: 0.18 K/UL (ref 0–0.44)
EOSINOPHILS RELATIVE PERCENT: 3 % (ref 0–4)
ERYTHROCYTE [DISTWIDTH] IN BLOOD BY AUTOMATED COUNT: 12.3 % (ref 11.5–14.9)
GFR, ESTIMATED: 87 ML/MIN/1.73M2
GLUCOSE SERPL-MCNC: 105 MG/DL (ref 74–99)
HCT VFR BLD AUTO: 40.5 % (ref 41–53)
HDLC SERPL-MCNC: 38 MG/DL
HGB BLD-MCNC: 14.4 G/DL (ref 13.5–17.5)
IMM GRANULOCYTES # BLD AUTO: <0.03 K/UL (ref 0–0.3)
IMM GRANULOCYTES NFR BLD: 0 %
LDLC SERPL CALC-MCNC: 60 MG/DL (ref 0–100)
LYMPHOCYTES NFR BLD: 1.62 K/UL (ref 1.1–3.7)
LYMPHOCYTES RELATIVE PERCENT: 25 % (ref 24–44)
MCH RBC QN AUTO: 33.2 PG (ref 26–34)
MCHC RBC AUTO-ENTMCNC: 35.6 G/DL (ref 31–37)
MCV RBC AUTO: 93.3 FL (ref 80–100)
MONOCYTES NFR BLD: 0.77 K/UL (ref 0.1–1.2)
MONOCYTES NFR BLD: 12 % (ref 3–12)
NEUTROPHILS NFR BLD: 59 % (ref 36–66)
NEUTS SEG NFR BLD: 3.74 K/UL (ref 1.5–8.1)
NRBC BLD-RTO: 0 PER 100 WBC
PLATELET # BLD AUTO: 209 K/UL (ref 150–450)
PMV BLD AUTO: 10 FL (ref 8–13.5)
POTASSIUM SERPL-SCNC: 3.9 MMOL/L (ref 3.7–5.3)
PROT SERPL-MCNC: 6.3 G/DL (ref 6.6–8.7)
RBC # BLD AUTO: 4.34 M/UL (ref 4.21–5.77)
SODIUM SERPL-SCNC: 140 MMOL/L (ref 136–145)
TRIGL SERPL-MCNC: 73 MG/DL (ref 0–149)
WBC OTHER # BLD: 6.4 K/UL (ref 3.5–11)

## 2025-05-17 PROCEDURE — 85025 COMPLETE CBC W/AUTO DIFF WBC: CPT

## 2025-05-17 PROCEDURE — 36415 COLL VENOUS BLD VENIPUNCTURE: CPT

## 2025-05-17 PROCEDURE — 80061 LIPID PANEL: CPT

## 2025-05-17 PROCEDURE — 80053 COMPREHEN METABOLIC PANEL: CPT
